# Patient Record
Sex: FEMALE | Race: WHITE | NOT HISPANIC OR LATINO | Employment: UNEMPLOYED | URBAN - METROPOLITAN AREA
[De-identification: names, ages, dates, MRNs, and addresses within clinical notes are randomized per-mention and may not be internally consistent; named-entity substitution may affect disease eponyms.]

---

## 2024-02-16 DIAGNOSIS — G62.9 NEUROPATHY: Primary | ICD-10-CM

## 2024-02-19 RX ORDER — PREGABALIN 75 MG/1
75 CAPSULE ORAL 2 TIMES DAILY
Qty: 180 CAPSULE | Refills: 0 | Status: SHIPPED | OUTPATIENT
Start: 2024-02-19

## 2024-04-15 ENCOUNTER — APPOINTMENT (OUTPATIENT)
Dept: PRIMARY CARE | Facility: CLINIC | Age: 74
End: 2024-04-15
Payer: MEDICARE

## 2024-04-24 ENCOUNTER — OFFICE VISIT (OUTPATIENT)
Dept: PRIMARY CARE | Facility: CLINIC | Age: 74
End: 2024-04-24
Payer: MEDICARE

## 2024-04-24 VITALS
HEIGHT: 64 IN | HEART RATE: 77 BPM | OXYGEN SATURATION: 98 % | DIASTOLIC BLOOD PRESSURE: 71 MMHG | SYSTOLIC BLOOD PRESSURE: 117 MMHG | TEMPERATURE: 98.1 F | WEIGHT: 190 LBS | BODY MASS INDEX: 32.44 KG/M2

## 2024-04-24 DIAGNOSIS — Z79.899 CONTROLLED SUBSTANCE AGREEMENT SIGNED: ICD-10-CM

## 2024-04-24 DIAGNOSIS — E78.5 HYPERLIPIDEMIA, UNSPECIFIED HYPERLIPIDEMIA TYPE: ICD-10-CM

## 2024-04-24 DIAGNOSIS — Z23 IMMUNIZATION DUE: ICD-10-CM

## 2024-04-24 DIAGNOSIS — Z12.11 COLON CANCER SCREENING: ICD-10-CM

## 2024-04-24 DIAGNOSIS — R73.01 IMPAIRED FASTING GLUCOSE: ICD-10-CM

## 2024-04-24 DIAGNOSIS — Z12.31 ENCOUNTER FOR SCREENING MAMMOGRAM FOR MALIGNANT NEOPLASM OF BREAST: ICD-10-CM

## 2024-04-24 DIAGNOSIS — I10 PRIMARY HYPERTENSION: ICD-10-CM

## 2024-04-24 DIAGNOSIS — Z00.00 MEDICARE ANNUAL WELLNESS VISIT, SUBSEQUENT: Primary | ICD-10-CM

## 2024-04-24 DIAGNOSIS — M20.42 HAMMER TOE OF LEFT FOOT: ICD-10-CM

## 2024-04-24 DIAGNOSIS — G62.9 NEUROPATHY: ICD-10-CM

## 2024-04-24 DIAGNOSIS — Z13.1 DIABETES MELLITUS SCREENING: ICD-10-CM

## 2024-04-24 DIAGNOSIS — R92.8 ABNORMAL SCREENING MAMMOGRAM: ICD-10-CM

## 2024-04-24 PROBLEM — M54.30 SCIATICA: Status: ACTIVE | Noted: 2024-04-24

## 2024-04-24 PROBLEM — N60.99 ATYPICAL HYPERPLASIA OF BREAST: Status: ACTIVE | Noted: 2024-04-24

## 2024-04-24 LAB
AMPHETAMINES UR QL SCN: NORMAL
BARBITURATES UR QL SCN: NORMAL
BENZODIAZ UR QL SCN: NORMAL
BZE UR QL SCN: NORMAL
CANNABINOIDS UR QL SCN: NORMAL
FENTANYL+NORFENTANYL UR QL SCN: NORMAL
METHADONE UR QL SCN: NORMAL
OPIATES UR QL SCN: NORMAL
OXYCODONE+OXYMORPHONE UR QL SCN: NORMAL
PCP UR QL SCN: NORMAL

## 2024-04-24 PROCEDURE — 3078F DIAST BP <80 MM HG: CPT | Performed by: FAMILY MEDICINE

## 2024-04-24 PROCEDURE — 1158F ADVNC CARE PLAN TLK DOCD: CPT | Performed by: FAMILY MEDICINE

## 2024-04-24 PROCEDURE — 1159F MED LIST DOCD IN RCRD: CPT | Performed by: FAMILY MEDICINE

## 2024-04-24 PROCEDURE — 99214 OFFICE O/P EST MOD 30 MIN: CPT | Performed by: FAMILY MEDICINE

## 2024-04-24 PROCEDURE — 1036F TOBACCO NON-USER: CPT | Performed by: FAMILY MEDICINE

## 2024-04-24 PROCEDURE — 80307 DRUG TEST PRSMV CHEM ANLYZR: CPT

## 2024-04-24 PROCEDURE — 90471 IMMUNIZATION ADMIN: CPT | Performed by: FAMILY MEDICINE

## 2024-04-24 PROCEDURE — 3074F SYST BP LT 130 MM HG: CPT | Performed by: FAMILY MEDICINE

## 2024-04-24 PROCEDURE — 1126F AMNT PAIN NOTED NONE PRSNT: CPT | Performed by: FAMILY MEDICINE

## 2024-04-24 PROCEDURE — 90715 TDAP VACCINE 7 YRS/> IM: CPT | Performed by: FAMILY MEDICINE

## 2024-04-24 PROCEDURE — G0439 PPPS, SUBSEQ VISIT: HCPCS | Performed by: FAMILY MEDICINE

## 2024-04-24 PROCEDURE — 1123F ACP DISCUSS/DSCN MKR DOCD: CPT | Performed by: FAMILY MEDICINE

## 2024-04-24 PROCEDURE — 1157F ADVNC CARE PLAN IN RCRD: CPT | Performed by: FAMILY MEDICINE

## 2024-04-24 PROCEDURE — 1170F FXNL STATUS ASSESSED: CPT | Performed by: FAMILY MEDICINE

## 2024-04-24 RX ORDER — LOSARTAN POTASSIUM 50 MG/1
50 TABLET ORAL DAILY
COMMUNITY
Start: 2024-02-22 | End: 2024-04-24 | Stop reason: SDUPTHER

## 2024-04-24 RX ORDER — LOSARTAN POTASSIUM 50 MG/1
50 TABLET ORAL DAILY
Qty: 90 TABLET | Refills: 1 | Status: SHIPPED | OUTPATIENT
Start: 2024-04-24 | End: 2024-10-21

## 2024-04-24 RX ORDER — ROSUVASTATIN CALCIUM 5 MG/1
5 TABLET, COATED ORAL DAILY
Qty: 90 TABLET | Refills: 1 | Status: SHIPPED | OUTPATIENT
Start: 2024-04-24 | End: 2024-10-21

## 2024-04-24 RX ORDER — ROSUVASTATIN CALCIUM 5 MG/1
5 TABLET, COATED ORAL DAILY
COMMUNITY
Start: 2024-04-05 | End: 2024-04-24 | Stop reason: SDUPTHER

## 2024-04-24 ASSESSMENT — PAIN SCALES - GENERAL: PAINLEVEL: 0-NO PAIN

## 2024-04-24 ASSESSMENT — ACTIVITIES OF DAILY LIVING (ADL)
MANAGING_FINANCES: INDEPENDENT
GROCERY_SHOPPING: INDEPENDENT
DRESSING: INDEPENDENT
BATHING: INDEPENDENT
TAKING_MEDICATION: INDEPENDENT
DOING_HOUSEWORK: INDEPENDENT

## 2024-04-24 ASSESSMENT — PATIENT HEALTH QUESTIONNAIRE - PHQ9
1. LITTLE INTEREST OR PLEASURE IN DOING THINGS: NOT AT ALL
SUM OF ALL RESPONSES TO PHQ9 QUESTIONS 1 AND 2: 0
2. FEELING DOWN, DEPRESSED OR HOPELESS: NOT AT ALL

## 2024-04-24 NOTE — PROGRESS NOTES
Subjective   Reason for Visit: Linda Garibay is an 73 y.o. female here for a medicare wellness exam (Blood work orders, mammogram, cologuard,), Med Refill, and signed contract for lyrica.          Reviewed all medications by prescribing practitioner or clinical pharmacist (such as prescriptions, OTCs, herbal therapies and supplements) and documented in the medical record.    HPI  1.  Medicare wellness/physical exam.  Pap: completed  Mammogram: last done 2021 benign   Colonoscopy: last done  10 years ago due  Immunizations: not done    Review of Systems  All pertinent positive symptoms are included in the history of present illness.    All other systems have been reviewed and are negative and noncontributory to this patient's current ailments.    No past medical history on file.  Past Surgical History:   Procedure Laterality Date    BI MAMMO GUIDED LOCALIZATION BREAST LEFT Left 10/10/2013    BI MAMMO GUIDED LOCALIZATION BREAST LEFT [facility]     Social History     Tobacco Use    Smoking status: Never    Smokeless tobacco: Never   Vaping Use    Vaping status: Never Used   Substance Use Topics    Alcohol use: Not Currently    Drug use: Never     No family history on file.  Allergies   Allergen Reactions    Ibuprofen Unknown    Nsaids (Non-Steroidal Anti-Inflammatory Drug) GI Upset     Immunization History   Administered Date(s) Administered    Influenza, injectable, quadrivalent 10/06/2017, 10/23/2018    Influenza, seasonal, injectable 08/06/2015, 08/16/2017    Influenza, seasonal, injectable, preservative free 10/16/2015    Tdap vaccine, age 7 year and older (BOOSTRIX, ADACEL) 04/24/2024     Current Outpatient Medications   Medication Instructions    losartan (COZAAR) 50 mg, oral, Daily    pregabalin (LYRICA) 75 mg, oral, 2 times daily    rosuvastatin (CRESTOR) 5 mg, oral, Daily       Patient Self Assessment of Health Status  Patient Self Assessment: Good    Nutrition and Exercise  Current Diet: Well  "Balanced Diet  Adequate Fluid Intake: Yes  Caffeine: Yes  Exercise Frequency: Regularly    Functional Ability/Level of Safety  Cognitive Impairment Observed: No cognitive impairment observed  Cognitive Impairment Reported: Cognitive impairment reported by patient or family    Home Safety Risk Factors: None    Objective   Visit Vitals  /71   Pulse 77   Temp 36.7 °C (98.1 °F)   Ht 1.626 m (5' 4\")   Wt 86.2 kg (190 lb)   SpO2 98%   BMI 32.61 kg/m²   Smoking Status Never   BSA 1.97 m²        The 10-year ASCVD risk score (Delfino CEDENO, et al., 2019) is: 14.9%    Values used to calculate the score:      Age: 73 years      Sex: Female      Is Non- : No      Diabetic: No      Tobacco smoker: No      Systolic Blood Pressure: 117 mmHg      Is BP treated: Yes      HDL Cholesterol: 51 MG/DL      Total Cholesterol: 227 MG/DL    Physical Exam  CONSTITUTIONAL - well nourished, well developed, looks like stated age, in no acute distress, not ill-appearing, and not tired appearing  SKIN - normal skin color and pigmentation, normal skin turgor without rash, lesions, or nodules visualized  HEAD - no trauma, normocephalic  EYES - pupils are equal and reactive to light, extraocular muscles are intact, and normal external exam  ENT - TM's intact, no injection, no signs of infection, uvula midline, normal tongue movement and throat normal, no exudate, nasal passage without discharge and patent  NECK - supple without rigidity, no neck mass was observed, no thyromegaly or thyroid nodules  CHEST - clear to auscultation, no wheezing, no crackles and no rales, good effort  CARDIAC - regular rate and regular rhythm, no skipped beats, no murmur  ABDOMEN - no organomegaly, soft, nontender, nondistended, normal bowel sounds, no guarding/rebound/rigidity, negative McBurney sign and negative Maloney sign  EXTREMITIES - no obvious or evident edema, no obvious or evident deformities  NEUROLOGICAL - normal gait, normal " balance, normal motor, no ataxia, alert, oriented and no focal signs  PSYCHIATRIC - alert, pleasant and cordial, age-appropriate  IMMUNOLOGIC - no cervical lymphadenopathy    Assessment/Plan   Problem List Items Addressed This Visit    None  Visit Diagnoses       Medicare annual wellness visit, subsequent    -  Primary    Colon cancer screening        Relevant Orders    Colonoscopy Screening; Average Risk Patient    Immunization due        Relevant Orders    Tdap vaccine, age 7 years and older  (BOOSTRIX) (Completed)    Encounter for screening mammogram for malignant neoplasm of breast        Relevant Orders    BI mammo bilateral diagnostic tomosynthesis    Hyperlipidemia, unspecified hyperlipidemia type        Relevant Medications    rosuvastatin (Crestor) 5 mg tablet    Other Relevant Orders    Lipid Panel    Primary hypertension        Relevant Medications    losartan (Cozaar) 50 mg tablet    Other Relevant Orders    CBC    Comprehensive Metabolic Panel    TSH with reflex to Free T4 if abnormal    Neuropathy        Relevant Orders    Drug Screen, Urine With Reflex to Confirmation    Hammer toe of left foot        Relevant Orders    Referral to Podiatry    Diabetes mellitus screening        Relevant Orders    Hemoglobin A1C    Impaired fasting glucose        Relevant Orders    Hemoglobin A1C    Abnormal screening mammogram        Relevant Orders    BI mammo bilateral diagnostic tomosynthesis    Controlled substance agreement signed        Relevant Orders    Drug Screen, Urine With Reflex to Confirmation          Linda is presenting for medicare wellness as well as concerns about being interested in weaning off of her lyrica.   Discussed risk vs benefit, patient expresses understanding.  We will proceed with 50mg BID for 2 weeks then 50mg every day, then 50mg every other day.   She will present to clinic after completion of this regimen.  We will re-evaluate at that point if she'll need to return to her previous  dosage for neuropathy  Signed a controlled substance agreement today    She also expressed concerns about losing her voice, pharyngeal exam noted no abnormalities, she has an ENT who will do a hearing test on her as well as clean out cerumen. Discussed her ENT will be able to do a laryngoscope and visualize any abnormalities at that time    Screening tests ordered today  Routine lab work also ordered    I have personally reviewed all available pertinent labs, imaging, and consult notes with the patient.     All questions and concerns were addressed. Patient verbalizes understanding instructions and agrees with established plan of care.     Patient seen and discussed with Dr. Dre Cristobal MD       Patient Care Team:  Timoteo Erickson DO as PCP - General

## 2024-04-25 NOTE — PROGRESS NOTES
I reviewed and examined the patient. I was present for the key exam elements, and I fully participated in the patient's care. I discussed the management of the care with the resident. I have personally reviewed the pertinent labs and imaging, as well as recent notes, with the patient. I have reviewed the note above and agree with the resident's medical decision making as documented in the resident's note, in addition to the following comments / findings:     Agree with the rest of the plan outlined below by resident physician. No red flags.      The patient understands and agrees to the assessment and plan of care. Patient has also agreed to follow up and comply with the treatment and evaluation as recommended today. Patient was instructed to call the office at [phone] should questions arise regarding their treatment or care.     Timoteo Erickson DO, FAOASM  Family Medicine   [address], [address]  [place] [address]     Timoteo Erickson DO

## 2024-05-01 ENCOUNTER — LAB (OUTPATIENT)
Dept: LAB | Facility: LAB | Age: 74
End: 2024-05-01
Payer: MEDICARE

## 2024-05-01 ENCOUNTER — TELEPHONE (OUTPATIENT)
Dept: PRIMARY CARE | Facility: CLINIC | Age: 74
End: 2024-05-01

## 2024-05-01 DIAGNOSIS — R73.01 IMPAIRED FASTING GLUCOSE: ICD-10-CM

## 2024-05-01 DIAGNOSIS — E78.5 HYPERLIPIDEMIA, UNSPECIFIED HYPERLIPIDEMIA TYPE: ICD-10-CM

## 2024-05-01 DIAGNOSIS — Z13.1 DIABETES MELLITUS SCREENING: ICD-10-CM

## 2024-05-01 DIAGNOSIS — I10 PRIMARY HYPERTENSION: ICD-10-CM

## 2024-05-01 LAB
ALBUMIN SERPL BCP-MCNC: 4.2 G/DL (ref 3.4–5)
ALP SERPL-CCNC: 85 U/L (ref 33–136)
ALT SERPL W P-5'-P-CCNC: 16 U/L (ref 7–45)
ANION GAP SERPL CALC-SCNC: 12 MMOL/L (ref 10–20)
AST SERPL W P-5'-P-CCNC: 17 U/L (ref 9–39)
BILIRUB SERPL-MCNC: 0.6 MG/DL (ref 0–1.2)
BUN SERPL-MCNC: 15 MG/DL (ref 6–23)
CALCIUM SERPL-MCNC: 8.9 MG/DL (ref 8.6–10.3)
CHLORIDE SERPL-SCNC: 102 MMOL/L (ref 98–107)
CHOLEST SERPL-MCNC: 159 MG/DL (ref 0–199)
CHOLESTEROL/HDL RATIO: 3.7
CO2 SERPL-SCNC: 29 MMOL/L (ref 21–32)
CREAT SERPL-MCNC: 0.83 MG/DL (ref 0.5–1.05)
EGFRCR SERPLBLD CKD-EPI 2021: 75 ML/MIN/1.73M*2
ERYTHROCYTE [DISTWIDTH] IN BLOOD BY AUTOMATED COUNT: 13.1 % (ref 11.5–14.5)
EST. AVERAGE GLUCOSE BLD GHB EST-MCNC: 126 MG/DL
GLUCOSE SERPL-MCNC: 96 MG/DL (ref 74–99)
HBA1C MFR BLD: 6 %
HCT VFR BLD AUTO: 42.4 % (ref 36–46)
HDLC SERPL-MCNC: 42.8 MG/DL
HGB BLD-MCNC: 13.5 G/DL (ref 12–16)
LDLC SERPL CALC-MCNC: 92 MG/DL
MCH RBC QN AUTO: 30.2 PG (ref 26–34)
MCHC RBC AUTO-ENTMCNC: 31.8 G/DL (ref 32–36)
MCV RBC AUTO: 95 FL (ref 80–100)
NON HDL CHOLESTEROL: 116 MG/DL (ref 0–149)
NRBC BLD-RTO: 0 /100 WBCS (ref 0–0)
PLATELET # BLD AUTO: 226 X10*3/UL (ref 150–450)
POTASSIUM SERPL-SCNC: 4.5 MMOL/L (ref 3.5–5.3)
PROT SERPL-MCNC: 6.9 G/DL (ref 6.4–8.2)
RBC # BLD AUTO: 4.47 X10*6/UL (ref 4–5.2)
SODIUM SERPL-SCNC: 138 MMOL/L (ref 136–145)
TRIGL SERPL-MCNC: 121 MG/DL (ref 0–149)
TSH SERPL-ACNC: 2.29 MIU/L (ref 0.44–3.98)
VLDL: 24 MG/DL (ref 0–40)
WBC # BLD AUTO: 4.5 X10*3/UL (ref 4.4–11.3)

## 2024-05-01 PROCEDURE — 80053 COMPREHEN METABOLIC PANEL: CPT

## 2024-05-01 PROCEDURE — 36415 COLL VENOUS BLD VENIPUNCTURE: CPT

## 2024-05-01 PROCEDURE — 80061 LIPID PANEL: CPT

## 2024-05-01 PROCEDURE — 84443 ASSAY THYROID STIM HORMONE: CPT

## 2024-05-01 PROCEDURE — 85027 COMPLETE CBC AUTOMATED: CPT

## 2024-05-01 PROCEDURE — 83036 HEMOGLOBIN GLYCOSYLATED A1C: CPT

## 2024-05-01 NOTE — TELEPHONE ENCOUNTER
Patient called stating that you were going send in prescriptions to wean her off her lyrica and the pharmacy has not received the prescriptions yet.

## 2024-05-08 ENCOUNTER — HOSPITAL ENCOUNTER (OUTPATIENT)
Dept: RADIOLOGY | Facility: HOSPITAL | Age: 74
Discharge: HOME | End: 2024-05-08
Payer: MEDICARE

## 2024-05-08 VITALS — BODY MASS INDEX: 30.73 KG/M2 | WEIGHT: 180 LBS | HEIGHT: 64 IN

## 2024-05-08 DIAGNOSIS — Z12.31 ENCOUNTER FOR SCREENING MAMMOGRAM FOR MALIGNANT NEOPLASM OF BREAST: ICD-10-CM

## 2024-05-08 DIAGNOSIS — R92.8 ABNORMAL SCREENING MAMMOGRAM: ICD-10-CM

## 2024-05-08 PROCEDURE — 77066 DX MAMMO INCL CAD BI: CPT | Performed by: STUDENT IN AN ORGANIZED HEALTH CARE EDUCATION/TRAINING PROGRAM

## 2024-05-08 PROCEDURE — 77062 BREAST TOMOSYNTHESIS BI: CPT

## 2024-05-08 PROCEDURE — G0279 TOMOSYNTHESIS, MAMMO: HCPCS | Performed by: STUDENT IN AN ORGANIZED HEALTH CARE EDUCATION/TRAINING PROGRAM

## 2024-08-15 ENCOUNTER — APPOINTMENT (OUTPATIENT)
Dept: OTOLARYNGOLOGY | Facility: CLINIC | Age: 74
End: 2024-08-15
Payer: MEDICARE

## 2024-08-15 VITALS — TEMPERATURE: 96.8 F | HEIGHT: 64 IN | WEIGHT: 193.6 LBS | BODY MASS INDEX: 33.05 KG/M2

## 2024-08-15 DIAGNOSIS — H93.13 BILATERAL TINNITUS: ICD-10-CM

## 2024-08-15 DIAGNOSIS — R44.2 PHANTOSMIA: ICD-10-CM

## 2024-08-15 DIAGNOSIS — H61.23 BILATERAL IMPACTED CERUMEN: ICD-10-CM

## 2024-08-15 DIAGNOSIS — H90.3 BILATERAL SENSORINEURAL HEARING LOSS: ICD-10-CM

## 2024-08-15 DIAGNOSIS — R26.89 IMBALANCE: Primary | ICD-10-CM

## 2024-08-15 DIAGNOSIS — R49.0 HOARSENESS: ICD-10-CM

## 2024-08-15 DIAGNOSIS — J34.2 DEVIATED NASAL SEPTUM: ICD-10-CM

## 2024-08-15 PROBLEM — I10 PRIMARY HYPERTENSION: Status: ACTIVE | Noted: 2024-05-01

## 2024-08-15 PROBLEM — E78.5 HYPERLIPIDEMIA: Status: ACTIVE | Noted: 2024-05-01

## 2024-08-15 PROCEDURE — 1036F TOBACCO NON-USER: CPT | Performed by: OTOLARYNGOLOGY

## 2024-08-15 PROCEDURE — 1159F MED LIST DOCD IN RCRD: CPT | Performed by: OTOLARYNGOLOGY

## 2024-08-15 PROCEDURE — 69210 REMOVE IMPACTED EAR WAX UNI: CPT | Performed by: OTOLARYNGOLOGY

## 2024-08-15 PROCEDURE — 1157F ADVNC CARE PLAN IN RCRD: CPT | Performed by: OTOLARYNGOLOGY

## 2024-08-15 PROCEDURE — 3008F BODY MASS INDEX DOCD: CPT | Performed by: OTOLARYNGOLOGY

## 2024-08-15 PROCEDURE — 99203 OFFICE O/P NEW LOW 30 MIN: CPT | Performed by: OTOLARYNGOLOGY

## 2024-08-15 PROCEDURE — 31575 DIAGNOSTIC LARYNGOSCOPY: CPT | Performed by: OTOLARYNGOLOGY

## 2024-08-15 NOTE — PROGRESS NOTES
Chief Complaint   Patient presents with    New Patient Visit     LOV 5/2019 WEAK VOICE, HEARING LOSS, NO RECENT AUDIO, BILATERAL TINNITUS, BURNING SMELL     HPI:  Linda Garibay is a 74 y.o. female who complains of some chronic problems with bilateral hearing loss.  Known history of bilateral sloping high-frequency sensorineural hearing loss last checked in 2019.  Has some wax impactions and itchy ears as well.  She also has some bilateral tinnitus which is longstanding.  She is also been having some hoarseness especially with singing over the past 2 to 3 years.  Some globus.  No sore throat, dysphagia, shortness of breath, coughing or throat clearing.  She may have some reflux.  Also feels that she gets off balance at times.  No sensation of movement or vertigo.  No lightheaded or near syncope.  She does have problems with some decrease sensation in her feet, as well as some lower extremity joint and back problems  On occasion she will have a phantom smell of smoke or chemicals.  She is not sure if it has something neighbors are burning or if it is truly a phantom smell.  No difficulty smelling normal things.  No significant nasal congestion    PMH:  Past Medical History:   Diagnosis Date    Atypical ductal hyperplasia, breast     HTN (hypertension)      Past Surgical History:   Procedure Laterality Date    BI MAMMO GUIDED BREAST LEFT LOCALIZATION Left 10/10/2013    BI MAMMO GUIDED LOCALIZATION BREAST LEFT [facility]    BREAST BIOPSY      BREAST LUMPECTOMY      TOTAL KNEE ARTHROPLASTY           Medications:     Current Outpatient Medications:     losartan (Cozaar) 50 mg tablet, Take 1 tablet (50 mg) by mouth once daily., Disp: 90 tablet, Rfl: 1    rosuvastatin (Crestor) 5 mg tablet, Take 1 tablet (5 mg) by mouth once daily., Disp: 90 tablet, Rfl: 1    pregabalin (Lyrica) 75 mg capsule, TAKE ONE CAPSULE BY MOUTH TWO TIMES A DAY (Patient not taking: Reported on 8/15/2024), Disp: 180 capsule, Rfl: 0  "    Allergies:  Allergies   Allergen Reactions    Ibuprofen Unknown    Nsaids (Non-Steroidal Anti-Inflammatory Drug) GI Upset        ROS:  Review of systems normal unless stated otherwise in the HPI and/or PMH.    Physical Exam:  Temperature 36 °C (96.8 °F), height 1.626 m (5' 4\"), weight 87.8 kg (193 lb 9.6 oz). Body mass index is 33.23 kg/m².     GENERAL APPEARANCE: Well developed and well nourished.  Alert and oriented in no acute distress.  Normal vocal quality.      HEAD/FACE: No erythema or edema or facial tenderness.  Normal facial nerve function bilaterally.    EAR:       EXTERNAL: Normal pinnas and bilateral obstructive cerumen impaction was removed by myself with instrumentation using the operating microscope.  Normal pinnas and external auditory canals after cleaning.       MIDDLE EAR: Tympanic membranes intact and mobile with normal landmarks.  Middle ear space appears well aerated.       TUBE STATUS: N/A       MASTOID CAVITY: N/A       HEARING: Gross hearing assessment is within normal limits.      NOSE:       VISUALIZED USING: Anterior rhinoscopy with headlight and nasal speculum.  Flexible scoping       DORSUM: Midline, nontraumatic appearance.       MUCOSA: Normal-appearing.       SECRETIONS: Normal.       SEPTUM: Right deviation       INFERIOR TURBINATES: Normal.       MIDDLE TURBINATES/MEATUS: Normal       BLEEDING: N/A         ORAL CAVITY/PHARYNX:       TEETH: Adequate dentition.       TONGUE: No mass or lesion.  Normal mobility.       FLOOR OF MOUTH: No mass or lesion.       PALATE: Normal hard palate, soft palate, and uvula.       OROPHARYNX: Normal without mass or lesion.       BUCCAL MUCOSA/GBS: Normal without mass or lesion.       LIPS: Normal.    LARYNX/HYPOPHARYNX/NASOPHARYNX: Flexible laryngoscopy was performed after consent secondary to an inadequate mirror examination.  The flexible laryngoscope was placed through the nasal cavity revealing normal nasopharynx, normal oropharynx, normal " hypopharynx, and normal larynx.  There is normal bilateral vocal cord mobility without any mucosal masses or lesions.    NECK: No palpable masses or abnormal adenopathy.  Trachea is midline.    THYROID: No thyromegaly or palpable nodule.    SALIVARY GLANDS: Normal bilateral parotid and submandibular glands by inspection and palpation.    TMJ's: Normal.    NEURO: Cranial nerve exam grossly normal bilaterally.       Assessment/Plan   Linda was seen today for new patient visit.  Diagnoses and all orders for this visit:  Imbalance (Primary)  -     Referral to Physical Therapy; Future  Hoarseness  -     Referral to Speech Therapy; Future  Bilateral sensorineural hearing loss  Bilateral impacted cerumen  Bilateral tinnitus  Deviated nasal septum  Phantosmia     Both ears were cleaned of impacted wax today which hopefully helps her hearing.  She does have chronic hearing loss and tinnitus that I think we should recheck an audiogram and likely recommend hearing aids.  No evidence of disease on examination regarding her hoarseness.  Educated about this and recommend speech and voice therapy.  Recommend balance therapy for her imbalance which I do not think is vestibular related.  Probably multifactorial.  Try [place] pot and Flonase for her phantosmia.  Also see if she is having in other places besides home where the neighbors may be burning things.  If is not getting better we may consider imaging.  Follow up in about 3 months (around 11/15/2024) for Recheck.     Arie Villalpando MD

## 2024-08-20 ENCOUNTER — EVALUATION (OUTPATIENT)
Dept: SPEECH THERAPY | Facility: CLINIC | Age: 74
End: 2024-08-20
Payer: MEDICARE

## 2024-08-20 DIAGNOSIS — R49.0 HOARSENESS: ICD-10-CM

## 2024-08-20 PROCEDURE — 92507 TX SP LANG VOICE COMM INDIV: CPT | Mod: GN | Performed by: SPEECH-LANGUAGE PATHOLOGIST

## 2024-08-20 PROCEDURE — 92524 BEHAVRAL QUALIT ANALYS VOICE: CPT | Mod: GN | Performed by: SPEECH-LANGUAGE PATHOLOGIST

## 2024-08-20 ASSESSMENT — ENCOUNTER SYMPTOMS
OCCASIONAL FEELINGS OF UNSTEADINESS: 0
DEPRESSION: 0
LOSS OF SENSATION IN FEET: 1

## 2024-08-20 NOTE — PROGRESS NOTES
Speech-Language Pathology    Voice Evaluation    Patient Name: Linda Garibay  MRN: [id number]  Today's Date: 8/20/2024     Time Calculation  Start Time: 0800  Stop Time: 0900  Time Calculation (min): 60 min  Eval - 40 min  Tx - 20 min    Current Problem:  1. Hoarseness  Referral to Speech Therapy    Follow Up In Speech Therapy          Voice Assessment:  Linda demonstrates vocal hoarseness with use of clavicular breathing pattern and frequent use of glottal naidu. Linda is dissatisfied with her voice, and feels it makes an impact on her daily life as evidenced by her report on the Vocal Handicap Index. Linda reports that this hoarseness started a few years ago, and has been progressively worsening. Linda will benefit from outpatient speech therapy services at this time to reduce risk for further vocal abuse and further impact on social/emotional wellbeing.      Voice Plan of Care:  Follow up 1x per week for 4 total visits.   Visits completed: 1/4    Subjective: Pt is pleasant and participative. Open minded to treatment approaches throughout. She is a receptive listener and engages in all topics of discussion. She is eager to make changes and improve her voice.     General Visit Information:   Per ENT evaluation 8/15/24:   Linda was seen today for new patient visit.  Diagnoses and all orders for this visit:  Imbalance (Primary)  -     Referral to Physical Therapy; Future  Hoarseness  -     Referral to Speech Therapy; Future  Bilateral sensorineural hearing loss  Bilateral impacted cerumen  Bilateral tinnitus  Deviated nasal septum  Phantosmia     Both ears were cleaned of impacted wax today which hopefully helps her hearing.  She does have chronic hearing loss and tinnitus that I think we should recheck an audiogram and likely recommend hearing aids.  No evidence of disease on examination regarding her hoarseness.  Educated about this and recommend speech and voice therapy.  Recommend balance therapy for her  "imbalance which I do not think is vestibular related.  Probably multifactorial.  Try [place] pot and Flonase for her phantosmia.  Also see if she is having in other places besides home where the neighbors may be burning things.  If is not getting better we may consider imaging.  Follow up in about 3 months (around 11/15/2024) for Recheck.         Objective   Vocal Handicap Index (VHI)   Linda completed the VHI, a patient-reported measure that indicates level of handicap second to voice impairment. Linda scored a total of 23, placing them in the \"Mild\" impairment range.      GRBAS Scale  Patient was assess for vocal hoarseness as characterized using the scale of Grade, Roughness, Breathiness, Asthenia, and Strain (GRBAS).   0 - normal, 1 - slightly impaired, 2- moderately impaired, 3 - extremely impaired.                  stGstrstastdstest:st st1st Roughness: 1              Breathiness: 2              Asthenia: 1              Strain: 0     Qualitative notes:   - MPT - 13 seconds   - Clavicular breathing style   - S/Z - 16 seconds/16 seconds = 1.0  - Glottal naidu vocal quality noted throughout  - Pitch breaks on vocal glide     Behavioral notes:  - Softer in the morning, takes time to \"build up strength\"  - Becomes stronger when using it more through the day  - Softness varies  - Drinks 3-4 12 oz cups coffee daily   - No carbonated beverages  - Aims for 64 oz of water daily, but probably doesn't drink that much   - No smoking  - Alcohol socially  - First thing in the morning, voice is \"weak\", but it doesn't last long.   - Has to \"put more effort behind it\"   - No throat irritation or significant post nasal drip  - Reports no throat clearing or chronic coughing - but does clear throat many times throughout evaluation.   - \"probably\" has reflux, comes intermittently when she has certain foods  - Can't humm anymore   - Can't sing now, says it \"sounds like a frog\"     Goals:   LTG 1: Pt to improve vocal quality and endurance " for adequate vocalization across activities of daily living as evidenced by reduction in score of pt-reported outcome measures to within normal limits.  (Date initiated: 8/20/24; target date: 10/20/24)  STG 1: Pt to demonstrate understanding and adequate follow through of vocal hygiene program with pt report of 85% follow through across environments  Date initiated: 8/20/24   Target date: 9/20/24              Baseline: 0%, not aware              Status: Progressing              Progress this visit: Pt provided with initial introduction and review of vocal hygiene techniques to help reduce vocal abuse/misuse. Handout provided to support. The main component is reducing talking at end of breath, reducing throat clearing, and reducing strain.   STG 2: Pt to commit to and demonstrate functional completion of behavioral changes to help improve vocal function with pt report of 85% follow through across environments.   Date initiated: 8/20/24   Target date: 9/20/24              Baseline: 0%, not aware              Status: Progressing              Progress this visit: Not addressed; plan to approach using CTT techniques at next session.   STG 3: Pt to participate in diaphragmatic breathing exercises to help support a sustained phonation timing of 12 seconds with adequate vocal quality.   Date initiated: 8/20/24   Target date: 9/20/24              Baseline: 0%, not aware              Status: Progressing              Progress this visit: Initiated beginning approaches to diaphragmatic breathing using visual and tactile cues to support improved accuracy with this technique. Handout provided to support continuation of first exercise for strengthening diaphragmatic breathing.     Voice Treatment: Yes - see details above  Time: 20 min        OP Education:  Pt was engaged in extensive education and discussion of the anatomy and phsyiology of the vocal mechanism, vocal wellness strategies, and strategies for diaphragmatic breathing  exercises. Handouts provided to support. Pt verbalizes understanding.

## 2024-08-27 ENCOUNTER — TREATMENT (OUTPATIENT)
Dept: SPEECH THERAPY | Facility: CLINIC | Age: 74
End: 2024-08-27
Payer: MEDICARE

## 2024-08-27 DIAGNOSIS — R49.0 HOARSENESS: ICD-10-CM

## 2024-08-27 PROCEDURE — 92507 TX SP LANG VOICE COMM INDIV: CPT | Mod: GN | Performed by: SPEECH-LANGUAGE PATHOLOGIST

## 2024-08-27 NOTE — PROGRESS NOTES
"Speech-Language Pathology    Voice Treatment    Patient Name: Linda Garibay  MRN: [id number]  Today's Date: 8/27/2024     Time Calculation  Start Time: 1115  Stop Time: 1200  Time Calculation (min): 45 min      Current Problem:  1. Hoarseness  Follow Up In Speech Therapy          Voice Assessment:  Linda participated in diaphragmatic breathing exercises and conversational training therapy (CTT) to maximize phonatory function and use.     Voice Plan of Care:  Follow up 1x per week for 4 total visits.   Visits completed: 2/4    Subjective: Pt is pleasant and participative. She reports good follow through with home tasks and recommended behavioral modifications        Objective   GRBAS Scale  Patient was assess for vocal hoarseness as characterized using the scale of Grade, Roughness, Breathiness, Asthenia, and Strain (GRBAS).   0 - normal, 1 - slightly impaired, 2- moderately impaired, 3 - extremely impaired.                  stGstrstastdstest:st st1st Roughness: 0              Breathiness: 1              Asthenia: 1              Strain: 0     Qualitative notes:   8/15/24  - MPT - 13 seconds   - Clavicular breathing style   - S/Z - 16 seconds/16 seconds = 1.0  - Glottal naidu vocal quality noted throughout  - Pitch breaks on vocal glide     Behavioral notes:  8/27/24:   -Keeping more hydrated  -Been talking more, which she feels helps the strength of her voice  -Rates her overall vocal quality today as 70% (with 100% being \"normal\" baseline function).        8/15/24  - Softer in the morning, takes time to \"build up strength\"  - Becomes stronger when using it more through the day  - Softness varies  - Drinks 3-4 12 oz cups coffee daily   - No carbonated beverages  - Aims for 64 oz of water daily, but probably doesn't drink that much   - No smoking  - Alcohol socially  - First thing in the morning, voice is \"weak\", but it doesn't last long.   - Has to \"put more effort behind it\"   - No throat irritation or significant post " "nasal drip  - Reports no throat clearing or chronic coughing - but does clear throat many times throughout evaluation.   - \"probably\" has reflux, comes intermittently when she has certain foods  - Can't humm anymore   - Can't sing now, says it \"sounds like a frog\"     Goals:   LTG 1: Pt to improve vocal quality and endurance for adequate vocalization across activities of daily living as evidenced by reduction in score of pt-reported outcome measures to within normal limits.  (Date initiated: 8/20/24; target date: 10/20/24)  STG 1: Pt to demonstrate understanding and adequate follow through of vocal hygiene program with pt report of 85% follow through across environments  Date initiated: 8/20/24   Target date: 9/20/24              Baseline: 0%, not aware              Status: GOAL MET              Progress this visit: Pt reports 100% follow through with recommended increase of hydration and use of belly breathing exercises.   STG 2: Pt to commit to and demonstrate functional completion of behavioral changes to help improve vocal function with pt report of 85% follow through across environments.   Date initiated: 8/20/24   Target date: 9/20/24              Baseline: 0%, not aware              Status: Progressing              Progress this visit: Pt instructed in a forward voice placement, clear speech strategies and their subsquent impact on overall voice and resonance through the tenants of CTT. Pt responded well, and was prompted to utilize these techniques daily during an intentional practice session using monologue training.   STG 3: Pt to participate in diaphragmatic breathing exercises to help support a sustained phonation timing of 12 seconds with adequate vocal quality.   Date initiated: 8/20/24   Target date: 9/20/24              Baseline: Unable              Status: Progressing              Progress this visit: Pt provided with moderate cuing for diaphragmatic breathing techniques with improved coordination " as compared to last session.     OP Education:  Pt was engaged in extensive education and discussion of the anatomy and phsyiology of the vocal mechanism, vocal wellness strategies, and strategies for diaphragmatic breathing exercises.. Pt verbalizes understanding.

## 2024-09-04 ENCOUNTER — TREATMENT (OUTPATIENT)
Dept: SPEECH THERAPY | Facility: CLINIC | Age: 74
End: 2024-09-04
Payer: MEDICARE

## 2024-09-04 DIAGNOSIS — R49.0 HOARSENESS: ICD-10-CM

## 2024-09-04 PROCEDURE — 92507 TX SP LANG VOICE COMM INDIV: CPT | Mod: GN | Performed by: SPEECH-LANGUAGE PATHOLOGIST

## 2024-09-04 NOTE — PROGRESS NOTES
"Speech-Language Pathology    Voice Treatment    Patient Name: Linda Garibay  MRN: [id number]  Today's Date: 9/4/2024     Time Calculation  Start Time: 0905  Stop Time: 0940  Time Calculation (min): 35 min      Current Problem:  1. Hoarseness  Follow Up In Speech Therapy          Voice Assessment:  Linda participated in review of vocal function exercises and discussion of plan of care,     Voice Plan of Care:  Follow up 1x per week for 4 total visits.   Visits completed: 3/4    Plan for discharge at next session    Subjective: Pt is pleasant and participative. She reports fair-at-best follow through with home tasks.         Objective   GRBAS Scale  Patient was assess for vocal hoarseness as characterized using the scale of Grade, Roughness, Breathiness, Asthenia, and Strain (GRBAS).   0 - normal, 1 - slightly impaired, 2- moderately impaired, 3 - extremely impaired.                  stGstrstastdstest:st st1st Roughness: 0              Breathiness: 0              Asthenia: 0              Strain: 0     Qualitative notes:   9/4/24  -Overall, voice sounds clear and WNL today.  - Pt rates her voice at 80% today (if 100% is \"perfect)    8/15/24  - MPT - 13 seconds   - Clavicular breathing style   - S/Z - 16 seconds/16 seconds = 1.0  - Glottal naidu vocal quality noted throughout  - Pitch breaks on vocal glide     Behavioral notes:  9/4/24:  - Has been keeping up with breathing exercises, but maybe not \"as much as she should\"  - Hasn't followed through much with the CTT approaches discussed last week.     8/27/24:   -Keeping more hydrated  -Been talking more, which she feels helps the strength of her voice  -Rates her overall vocal quality today as 70% (with 100% being \"normal\" baseline function).        8/15/24  - Softer in the morning, takes time to \"build up strength\"  - Becomes stronger when using it more through the day  - Softness varies  - Drinks 3-4 12 oz cups coffee daily   - No carbonated beverages  - Aims for 64 oz " "of water daily, but probably doesn't drink that much   - No smoking  - Alcohol socially  - First thing in the morning, voice is \"weak\", but it doesn't last long.   - Has to \"put more effort behind it\"   - No throat irritation or significant post nasal drip  - Reports no throat clearing or chronic coughing - but does clear throat many times throughout evaluation.   - \"probably\" has reflux, comes intermittently when she has certain foods  - Can't humm anymore   - Can't sing now, says it \"sounds like a frog\"     Goals:   LTG 1: Pt to improve vocal quality and endurance for adequate vocalization across activities of daily living as evidenced by reduction in score of pt-reported outcome measures to within normal limits.  (Date initiated: 8/20/24; target date: 10/20/24)  STG 1: Pt to demonstrate understanding and adequate follow through of vocal hygiene program with pt report of 85% follow through across environments  Date initiated: 8/20/24   Target date: 9/20/24              Baseline: 0%, not aware              Status: GOAL MET              Progress this visit: N/A  STG 2: Pt to commit to and demonstrate functional completion of behavioral changes to help improve vocal function with pt report of 85% follow through across environments.   Date initiated: 8/20/24   Target date: 9/20/24              Baseline: 0%, not aware              Status: Progressing              Progress this visit: Pt reports no follow through with home exercises/CTT from last session. Instructed in vocal function exercises which pt completes to 90% with mod cues for technique and breath coordination.   STG 3: Pt to participate in diaphragmatic breathing exercises to help support a sustained phonation timing of 12 seconds with adequate vocal quality.   Date initiated: 8/20/24   Target date: 9/20/24              Baseline: Unable              Status: Progressing              Progress this visit: Pt provided with moderate cuing for diaphragmatic " breathing techniques in coordination with vocal function exercises with a max phonation time of 8 seconds.     OP Education:  Pt was engaged in discussion of plan of care, home exercise program, and need for more follow through with home tasks. She is in agreement for plan to extend next visit to 2 weeks to allow for more time for follow through, then discharge with functional maint plan.

## 2024-09-07 NOTE — PROGRESS NOTES
Physical Therapy Evaluation and Treatment     Patient Name: Linda Garibay  MRN: [id number]  Encounter date: 9/9/2024  Time Calculation  Start Time: 1615  Stop Time: 1715  Time Calculation (min): 60 min  PT Evaluation Time Entry  PT Evaluation (Moderate) Time Entry: 30  PT Therapeutic Procedures Time Entry  Neuromuscular Re-Education Time Entry: 8  Therapeutic Activity Time Entry: 20    Visit # 1 of 10  Visits/Dates Authorized: NO AUTH / [place] VISITS     Current Problem:   Problem List Items Addressed This Visit             ICD-10-CM    Imbalance - Primary R26.89    Relevant Orders    Follow Up In Physical Therapy    Difficulty walking R26.2    Relevant Orders    Follow Up In Physical Therapy    Chronic bilateral low back pain with bilateral sciatica M54.42, M54.41, G89.29    Relevant Orders    Follow Up In Physical Therapy     Precautions:  Precautions  Precautions Comment: spondylolisthesis, fall risk  Past Medical History Relevant to Rehab: HTN, R TKR 1/2020    Subjective Evaluation    History of Present Illness  Date of onset: 1/7/2020 (imbalance over several years, onset of sciatic pain upon home from R TKR)  Mechanism of injury: Pt reports 6-8 falls over several years, 3 times in past year. In/outdoors, no warning. Denies lightheadedness. Pt feels sensation in feet may be reduced, can feel things but maybe lessened. Denies not perceiving temperature in feet.    After 1/2020 knee replacement, at start of [facility] had terrible sciatic pain that limited her, didn't trust getting through grocery store even with cart, like legs may not keep going.  Had injections. Started pregablin for sciatica around 2021. Weaned from pregabalin over the summer due to concern for side effects. Not as bad to finish shopping as before but can't be nearly as active as she wants to. Ongoing R knee issues since TKR, won't bend at times, not a feeling in joint, rather that it is in the muscle of anteromedial knee. Has cont to have R  "anteromedial knee pain and unable to advance strength due to pain.  At some point L knee lost full straightening, learned from R TKR that full ext is important, attempted exercises, e.g. propping and overpressure, but pain increased badly.  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 Feels unable to assist with most tasks now. Attempting balance exercises from [facility]. Attempts to use exercise equipment but can't tolerate it long at all. Used to walk 2-3 miles regularly, can't manage it now.  Pt states her quality of life has greatly suffered with these limitations.    Per ENT consult 8/15/24 (for hearing and throat issues, pt brought up ongoing balance problem):  \"...feels that she gets off balance at times. No sensation of movement or vertigo. No lightheaded or near syncope. She does have problems with some decrease sensation in her feet, as well as some lower extremity joint and back problems.\"    Pain  Current pain ratin  At best pain ratin (at best aware of knee but not pain)  At worst pain ratin (6-7/10 with extra activity, e.g. holiday prep, can't sleep)  Location: B anterior knee pain, B LBP, L lateral hip pain, and pain down R LE posteriorly      Pain Assessment: 0-10 (1-7/10)     Objective     Postural Observations    Additional Postural Observation Details  R anterior innominate:   R iliac crest superior standing, sitting, prone  R PSIS superior prone  R ischial tuberosity superior prone  R ASIS inferior supine      Neurological Testing     Additional Neurological Details  Pt reports perception of decreased pedal sensation, monofilament testing deferred this visit    Palpation     Additional Palpation Details  Hypertonicity LPS and gluteals    Tenderness     Left Hip   Tenderness in the PSIS.     Right Hip   Tenderness in the ASIS.     Additional Tenderness Details  TTP L greater trochanter    Active Range of Motion     Additional Active Range of " Motion Details  Cervical:  No gross ROM restrictions all planes    Lumbar:  Asymmetrical rotation/sidebending  Decreased flexion approx 25%  Decreased extension approx 50%    Hip:  Asymmetrical and decreased prone hip IR/ER  Lacks full extension approx 5-10 degrees, lacks full flexion, painful position change supine    Strength/Myotome Testing     Additional Strength Details  Decreased lumbopelvic and pelvohip strength    Ambulation     Observational Gait   Gait: antalgic and asymmetric   Decreased walking speed and stride length.     Additional Observational Gait Details  No assistive device, decreased arm swing and torso rotation, no path deviation      Romberg: Firm Eyes Open 30 sec, Eyes Closed 30 sec , no gross change in postural sway EO vs EC, able to add head turns without visible change in postural sway    Other Measures  Activities - Specific Balance Confidence Scale: 59% confidence (940/1600)    Treatments:     Neuromuscular Re-Ed:   Instructed and performed MET for R anterior innominate, neutral pelvis achieved, pt felt different upon standing.    Ther-Act:  Educated pt re bony pelvic anatomy and R anterior innominate yielding altered forces throughout lower kinematic chain and altered somatosensory input.  Instructed/trialed activities for neutral pelvis  Demonstrated Serola SI belt, sense of support/confidence in walking reported, instructed donning/doffing.  Instructed roll to side and sit up as a unit to get up    Assessment & Plan     Assessment  Impairments: abnormal gait, abnormal or restricted ROM, activity intolerance, impaired balance, impaired physical strength, lacks appropriate home exercise program, pain with function and safety issue  Assessment details:     Pt is a 75 y/o female with imbalance and pain limiting function. Pt with 6-8 falls, 3 in past year. Pt with back pain, B knee pain, and perception of decreased pedal sensation. Pt with postural asymmetries including restricted L knee  extension ROM and R anterior innominate. Neutral pelvis achieved with muscle energy technique, pt with sense of greater security and improved gait, further improved with Serola SI belt donned. No gross vestibular impairment. Assessment will be ongoing. Pt needs skilled PT to address factors limiting function and yielding falls.  Prognosis: good    Plan  Therapy options: will be seen for skilled physical therapy services  Planned modality interventions: traction, thermotherapy (hydrocollator packs) and electrical stimulation/Russian stimulation  Other planned modality interventions: light therapy, DN, kinesiology taping  Planned therapy interventions: abdominal trunk stabilization, manual therapy, neuromuscular re-education, orthotic fitting/training, postural training, strengthening, stretching, home exercise program, functional ROM exercises, flexibility, body mechanics training and balance/weight-bearing training  Other planned therapy interventions: Serola SI belt  Frequency: 2x week  Duration in visits: 10  Treatment plan discussed with: patient       Moderate complexity due to patient's clinical presentation being evolving with changing characteristics, with comorbidities/complexities to include repeated falls, multiple orthopedic impairments, loss of function, all of which may negatively impact rehab tolerance and progression.     Post-Treatment Symptoms:  Response to Interventions: Pt with greater comfort and more confidence in balance and walking, walked faster/longer strides than in years. Sense of support with Serola SI belt    Goals:   Active       PT Problem       PT Goals       Start:  09/09/24    Expected End:  12/08/24       1) Indep HEP and progression.  2) ABC Scale improved to at least 80% from 59% at eval.  3) Pt will perform home and yard tasks ad emily without symptom exacerbation.  4) Pt will walk for leisure/wellness ad emily without symptom exacerbation falls/near falls         Patient Stated  Goal 1       Start:  09/10/24    Expected End:  12/08/24

## 2024-09-09 ENCOUNTER — CLINICAL SUPPORT (OUTPATIENT)
Dept: PHYSICAL THERAPY | Facility: CLINIC | Age: 74
End: 2024-09-09
Payer: MEDICARE

## 2024-09-09 DIAGNOSIS — R26.2 DIFFICULTY WALKING: ICD-10-CM

## 2024-09-09 DIAGNOSIS — M54.42 CHRONIC BILATERAL LOW BACK PAIN WITH BILATERAL SCIATICA: ICD-10-CM

## 2024-09-09 DIAGNOSIS — G89.29 CHRONIC BILATERAL LOW BACK PAIN WITH BILATERAL SCIATICA: ICD-10-CM

## 2024-09-09 DIAGNOSIS — M54.41 CHRONIC BILATERAL LOW BACK PAIN WITH BILATERAL SCIATICA: ICD-10-CM

## 2024-09-09 DIAGNOSIS — R26.89 IMBALANCE: Primary | ICD-10-CM

## 2024-09-09 PROCEDURE — 97530 THERAPEUTIC ACTIVITIES: CPT | Mod: GP

## 2024-09-09 PROCEDURE — 97112 NEUROMUSCULAR REEDUCATION: CPT | Mod: GP

## 2024-09-09 PROCEDURE — 97162 PT EVAL MOD COMPLEX 30 MIN: CPT | Mod: GP

## 2024-09-09 ASSESSMENT — PAIN - FUNCTIONAL ASSESSMENT: PAIN_FUNCTIONAL_ASSESSMENT: 0-10

## 2024-09-10 PROBLEM — G89.29 CHRONIC BILATERAL LOW BACK PAIN WITH BILATERAL SCIATICA: Status: ACTIVE | Noted: 2024-09-10

## 2024-09-10 PROBLEM — M54.41 CHRONIC BILATERAL LOW BACK PAIN WITH BILATERAL SCIATICA: Status: ACTIVE | Noted: 2024-09-10

## 2024-09-10 PROBLEM — R26.2 DIFFICULTY WALKING: Status: ACTIVE | Noted: 2024-09-10

## 2024-09-10 PROBLEM — R26.89 IMBALANCE: Status: ACTIVE | Noted: 2024-09-10

## 2024-09-10 PROBLEM — M54.42 CHRONIC BILATERAL LOW BACK PAIN WITH BILATERAL SCIATICA: Status: ACTIVE | Noted: 2024-09-10

## 2024-09-10 ASSESSMENT — ENCOUNTER SYMPTOMS
PAIN SCALE: 2
PAIN SCALE AT LOWEST: 1
OCCASIONAL FEELINGS OF UNSTEADINESS: 1
PAIN SCALE AT HIGHEST: 7
LOSS OF SENSATION IN FEET: 1

## 2024-09-10 ASSESSMENT — ACTIVITIES OF DAILY LIVING (ADL): POOR_BALANCE: 1

## 2024-09-11 ENCOUNTER — APPOINTMENT (OUTPATIENT)
Dept: SPEECH THERAPY | Facility: CLINIC | Age: 74
End: 2024-09-11
Payer: MEDICARE

## 2024-09-11 ENCOUNTER — TREATMENT (OUTPATIENT)
Dept: PHYSICAL THERAPY | Facility: CLINIC | Age: 74
End: 2024-09-11
Payer: MEDICARE

## 2024-09-11 DIAGNOSIS — R26.2 DIFFICULTY WALKING: ICD-10-CM

## 2024-09-11 DIAGNOSIS — R26.89 IMBALANCE: Primary | ICD-10-CM

## 2024-09-11 DIAGNOSIS — M54.41 CHRONIC BILATERAL LOW BACK PAIN WITH BILATERAL SCIATICA: ICD-10-CM

## 2024-09-11 DIAGNOSIS — M54.42 CHRONIC BILATERAL LOW BACK PAIN WITH BILATERAL SCIATICA: ICD-10-CM

## 2024-09-11 DIAGNOSIS — G89.29 CHRONIC BILATERAL LOW BACK PAIN WITH BILATERAL SCIATICA: ICD-10-CM

## 2024-09-11 PROCEDURE — 97140 MANUAL THERAPY 1/> REGIONS: CPT | Mod: GP

## 2024-09-11 PROCEDURE — 97530 THERAPEUTIC ACTIVITIES: CPT | Mod: GP

## 2024-09-11 PROCEDURE — 97110 THERAPEUTIC EXERCISES: CPT | Mod: GP

## 2024-09-11 ASSESSMENT — PAIN SCALES - GENERAL: PAINLEVEL_OUTOF10: 4

## 2024-09-11 ASSESSMENT — PAIN - FUNCTIONAL ASSESSMENT: PAIN_FUNCTIONAL_ASSESSMENT: 0-10

## 2024-09-11 NOTE — PROGRESS NOTES
"Physical Therapy Treatment    Patient Name: Linda Garibay  MRN: [id number]  Encounter date: 9/11/2024    Time Calculation  Start Time: 0830  Stop Time: 0915  Time Calculation (min): 45 min  PT Therapeutic Procedures Time Entry  Manual Therapy Time Entry: 8  Neuromuscular Re-Education Time Entry: 5  Therapeutic Exercise Time Entry: 20  Therapeutic Activity Time Entry: 8    Visit # 2 of 10  Visits/Dates Authorized: NO AUTH / [place] VISITS     Current Problem:   Problem List Items Addressed This Visit             ICD-10-CM    Imbalance - Primary R26.89    Difficulty walking R26.2    Chronic bilateral low back pain with bilateral sciatica M54.42, M54.41, G89.29     Precautions:    spondylolisthesis, fall risk  Past Medical History Relevant to Rehab: HTN, R TKR 1/2020       Subjective   General:   General Comment: Doing ok since last session, hard to get comfortable with R knee pain though. Didn't feel she could lois SI belt securely. Did not yet attempt taking a walk. Pulled weeds in yard etc but without belt due to not staying on.    Pre-Treatment Symptoms:   Pain Assessment: 0-10  0-10 (Numeric) Pain Score: 4 (1-7/10 R>L knee, back)    Objective   Findings:    Neutral pelvis  TTP L peripatellar area, PF jt hypomobile alon superiorly but also M/L  Lacks 10 degrees L knee ext, painful flexion as well  Not TTP R peripatellar       Treatments:   Neuromuscular Re-Ed:   Reviewed MET for R anterior innominate     Ther-Act:  Reviewed donning Serola SI belt supine, able to pull tension effectively, sense of support/confidence in walking reported, instructed donning/doffing.  Instructed and applied kinesiology taping L PFjt \"U\" strip to increase superior glide, R knee anteromedial and anterolateral \"I\" strips.      Therapeutic Exercise: vc/tc throughout for lumbopelvic stabilization, SI belt on throughout  Hookly hip abd/add green tband/pillow  Iso abs hookly march  Tball bridge small ROM  Tball LTR  Tball DKTC    Gait " Training:    Manual Therapy:   L patella mobs, proximal tib-fib mobs    Modalities:       Assessment:   PT Assessment  Assessment Comment: Pt receptive to instruction, multiple orthopedic issues negatively influence each other.    Post-Treatment Symptoms:   Response to Interventions: Serola SI belt donned correctly yields sense of support.    Plan:    Advance to ability, determine benefit of taping, repeat instruction for home use.    Goals:   Active       PT Problem       PT Goals       Start:  09/09/24    Expected End:  12/08/24       1) Indep HEP and progression.  2) ABC Scale improved to at least 80% from 59% at eval.  3) Pt will perform home and yard tasks ad emily without symptom exacerbation.  4) Pt will walk for leisure/wellness ad emily without symptom exacerbation falls/near falls         Patient Stated Goal 1       Start:  09/10/24    Expected End:  12/08/24

## 2024-09-13 ENCOUNTER — TREATMENT (OUTPATIENT)
Dept: PHYSICAL THERAPY | Facility: CLINIC | Age: 74
End: 2024-09-13
Payer: MEDICARE

## 2024-09-13 DIAGNOSIS — R26.2 DIFFICULTY WALKING: ICD-10-CM

## 2024-09-13 DIAGNOSIS — M54.41 CHRONIC BILATERAL LOW BACK PAIN WITH BILATERAL SCIATICA: ICD-10-CM

## 2024-09-13 DIAGNOSIS — R26.89 IMBALANCE: ICD-10-CM

## 2024-09-13 DIAGNOSIS — G89.29 CHRONIC BILATERAL LOW BACK PAIN WITH BILATERAL SCIATICA: ICD-10-CM

## 2024-09-13 DIAGNOSIS — M54.42 CHRONIC BILATERAL LOW BACK PAIN WITH BILATERAL SCIATICA: ICD-10-CM

## 2024-09-13 PROCEDURE — 97110 THERAPEUTIC EXERCISES: CPT | Mod: GP

## 2024-09-13 PROCEDURE — 97140 MANUAL THERAPY 1/> REGIONS: CPT | Mod: GP

## 2024-09-13 PROCEDURE — 97530 THERAPEUTIC ACTIVITIES: CPT | Mod: GP

## 2024-09-13 ASSESSMENT — PAIN - FUNCTIONAL ASSESSMENT: PAIN_FUNCTIONAL_ASSESSMENT: 0-10

## 2024-09-13 ASSESSMENT — PAIN SCALES - GENERAL: PAINLEVEL_OUTOF10: 4

## 2024-09-13 NOTE — PROGRESS NOTES
"Physical Therapy Treatment    Patient Name: Linda Garibay  MRN: [id number]  Encounter date: 9/13/2024    Time Calculation  Start Time: 0745  Stop Time: 0845  Time Calculation (min): 60 min  PT Modalities Time Entry  Infrared Time Entry: 10  PT Therapeutic Procedures Time Entry  Manual Therapy Time Entry: 5  Therapeutic Exercise Time Entry: 20  Therapeutic Activity Time Entry: 15    Visit # 3 of 10  Visits/Dates Authorized: NO AUTH / [place] VISITS     Current Problem:   Problem List Items Addressed This Visit             ICD-10-CM    Imbalance R26.89    Difficulty walking R26.2    Chronic bilateral low back pain with bilateral sciatica M54.42, M54.41, G89.29     Precautions:    spondylolisthesis, fall risk  Past Medical History Relevant to Rehab: HTN, R TKR 1/2020       Subjective   General:   General Comment: Took a walk evening of last appt, 11-13k steps (smartwatch and pedometer disagree), normal average 7-8k, felt miserable that night with back pain and B knee pain. May have been walking off due to R hammertoe rubbing shoe. Did have a LOB a couple times she could catch herself to recover though.    Pre-Treatment Symptoms:   Pain Assessment: 0-10  0-10 (Numeric) Pain Score: 4    Objective   Findings:    Neutral pelvis  Less TTP L peripatellar area, PF jt hypomobile alon superiorly but also M/L, less vs prior visit.  Lacks 10 degrees L knee ext  Not TTP R peripatellar       Treatments:   Neuromuscular Re-Ed:     Ther-Act:  applied kinesiology taping L PFjt \"U\" strip to increase superior glide, R knee anteromedial and anterolateral \"I\" strips.  Educated pt re footwear/orthotics influence on symptoms and balance      Therapeutic Exercise: vc/tc throughout for lumbopelvic stabilization, SI belt on throughout  Anti-rotation stir the pot tband blue  Anti-rotation paloff press tband blue  QS with heel prop    Reviewed HEP, has ball now but larger  Hookly hip abd/add green tband/pillow  Iso abs hookly march  Tball bridge " small ROM  Tball LTR  Tball DKTC    Gait Training:    Manual Therapy:   L patella mobs    Deferred: proximal tib-fib mobs    Modalities:   Light Therapy: Infrared/Red wave length; 10J/cm2 applied with pads; applied to R/L anteromedial knees, in Seated      Assessment:   PT Assessment  Assessment Comment: Pt with increased activity level but not worst symptoms. Does report standing, e.g. holiday meal prep, worse than movement for pain. Pt would benefit from optimizing footwear and orthotics to reduce excessive lower kinematic chain rotatory forces for knee and back symptoms as well as balance. Pt to look for orthotics used remotely and bring athletic shoes (sandals today). Determine response to light therapy.    Post-Treatment Symptoms:   Response to Interventions: patella mobs felt good    Plan:    Advance to ability, determine benefit of taping, repeat instruction for home use.    Goals:   Active       PT Problem       PT Goals       Start:  09/09/24    Expected End:  12/08/24       1) Indep HEP and progression.  2) ABC Scale improved to at least 80% from 59% at eval.  3) Pt will perform home and yard tasks ad emily without symptom exacerbation.  4) Pt will walk for leisure/wellness ad emily without symptom exacerbation falls/near falls         Patient Stated Goal 1       Start:  09/10/24    Expected End:  12/08/24

## 2024-09-18 ENCOUNTER — APPOINTMENT (OUTPATIENT)
Dept: SPEECH THERAPY | Facility: CLINIC | Age: 74
End: 2024-09-18
Payer: MEDICARE

## 2024-09-20 ENCOUNTER — APPOINTMENT (OUTPATIENT)
Dept: SPEECH THERAPY | Facility: CLINIC | Age: 74
End: 2024-09-20
Payer: MEDICARE

## 2024-09-20 DIAGNOSIS — R49.0 HOARSENESS: ICD-10-CM

## 2024-09-20 PROCEDURE — 92507 TX SP LANG VOICE COMM INDIV: CPT | Mod: GN | Performed by: SPEECH-LANGUAGE PATHOLOGIST

## 2024-09-20 NOTE — PROGRESS NOTES
Speech-Language Pathology    Voice Treatment  & Discharge Summary    Patient Name: Linda Garibay  MRN: [id number]  Today's Date: 9/20/2024     Time Calculation  Start Time: 1345  Stop Time: 1420  Time Calculation (min): 35 min      Current Problem:  1. Hoarseness  Follow Up In Speech Therapy            Discharge Summary:   Discharge Information: Date of discharge 9/20/24, Date of last visit 9/20/24, Date of evaluation 8/20/24, Number of attended visits 4, Referred by Dr. Villalpando, and Referred for Hoarseness    Therapy Summary: Linda participated in an individualized treatment program to address chronic vocal hoarseness. She was engaged in diaphragmatic breathing exercises, vocal function exercises, and conversation training therapy to address vocal hoarseness. She responded well to techniques within the therapy room, and was able to achieve improved voicing and vocal quality. Linda demonstrates good awareness of recommended techniques and demonstrates adequate skills for self management moving forward. Linda reports feeling notable progress on her voice goals with the help of speech therapy. While she acknowledges need for further progress and continued practice, she feels prepared to do so with the skills discussed throughout the course of therapy. Linda is in agreement with discharge at this time, and is encouraged to contact SLP for any further questions/concerns. A miantenence plan was reviewed to faciliate ongoing progress following discharge.     Discharge Status: Goals achieved     Rehab Discharge Reason: Achieved all and/or the most significant goals(s)    Treatment Summary    Voice Assessment:  Linda participated in review of vocal function exercises and discussion of plan of care,     Voice Plan of Care:  Follow up 1x per week for 4 total visits.   Visits completed: 3/4    Discharge speech therapy.     Subjective: Pt is pleasant and participative. She reports improved follow through with HEP.         "Objective   GRBAS Scale  Patient was assess for vocal hoarseness as characterized using the scale of Grade, Roughness, Breathiness, Asthenia, and Strain (GRBAS).   0 - normal, 1 - slightly impaired, 2- moderately impaired, 3 - extremely impaired.                  stGstrstastdstest:st st1st Roughness: 0              Breathiness: 0              Asthenia: 0              Strain: 0     Qualitative notes:   9/20/24  -Notices that she has improved vocal quality   -MPT 16 seconds  -Diaphragm breathing  -s/z ratio: 16 seconds/17 seconds   -Clear pitch glides    9/4/24  -Overall, voice sounds clear and WNL today.  - Pt rates her voice at 80% today (if 100% is \"perfect)    8/15/24  - MPT - 13 seconds   - Clavicular breathing style   - S/Z - 16 seconds/16 seconds = 1.0  - Glottal naidu vocal quality noted throughout  - Pitch breaks on vocal glide     Behavioral notes:  9/20/24:  -Pt reports decrease in the amount of effort that she needs to take with her voice  -Feels her voice is more full, but still a bit \"gravely\"  -Noticed a relief of morning hoarseness  -Has taken to taking TUMS before bed after noticing a globus sensation in the evenings. Feels like this has helped with the globus sensation as well as morning hoarseness    9/4/24:  - Has been keeping up with breathing exercises, but maybe not \"as much as she should\"  - Hasn't followed through much with the CTT approaches discussed last week.     8/27/24:   -Keeping more hydrated  -Been talking more, which she feels helps the strength of her voice  -Rates her overall vocal quality today as 70% (with 100% being \"normal\" baseline function).        8/15/24  - Softer in the morning, takes time to \"build up strength\"  - Becomes stronger when using it more through the day  - Softness varies  - Drinks 3-4 12 oz cups coffee daily   - No carbonated beverages  - Aims for 64 oz of water daily, but probably doesn't drink that much   - No smoking  - Alcohol socially  - First thing in the " "morning, voice is \"weak\", but it doesn't last long.   - Has to \"put more effort behind it\"   - No throat irritation or significant post nasal drip  - Reports no throat clearing or chronic coughing - but does clear throat many times throughout evaluation.   - \"probably\" has reflux, comes intermittently when she has certain foods  - Can't humm anymore   - Can't sing now, says it \"sounds like a frog\"     Goals:   LTG 1: Pt to improve vocal quality and endurance for adequate vocalization across activities of daily living as evidenced by reduction in score of pt-reported outcome measures to within normal limits.  (Date initiated: 8/20/24; target date: 10/20/24)  STG 1: Pt to demonstrate understanding and adequate follow through of vocal hygiene program with pt report of 85% follow through across environments  Date initiated: 8/20/24   Target date: 9/20/24              Baseline: 0%, not aware              Status: GOAL MET              Progress this visit: N/A  STG 2: Pt to commit to and demonstrate functional completion of behavioral changes to help improve vocal function with pt report of 85% follow through across environments.   Date initiated: 8/20/24   Target date: 9/20/24              Baseline: 0%, not aware              Status: Goal met              Progress this visit: Pt recalls recommended behavioral modifications as discussed throughout POC and demonstrates adequate skills and knowledge for continued self management.   STG 3: Pt to participate in diaphragmatic breathing exercises to help support a sustained phonation timing of 12 seconds with adequate vocal quality.   Date initiated: 8/20/24   Target date: 9/20/24              Baseline: Unable              Status: GOAL MET               Progress this visit: Pt recalls recommended diaphragmatic breathing exercises as discussed throughout POC and demonstrates adequate skills and knowledge for continued self management. Completes a sustained phonation timing of 16 " seconds on this date.     OP Education:  Pt was engaged in discussion of plan of care, home exercise program, and functional maintenance plan. Discussed strategies for integrating CTT techniques to become more automatic. Verbalized understanding is noted. She is in agreement with discharge at this time.

## 2024-09-27 ENCOUNTER — TREATMENT (OUTPATIENT)
Dept: PHYSICAL THERAPY | Facility: CLINIC | Age: 74
End: 2024-09-27
Payer: MEDICARE

## 2024-09-27 DIAGNOSIS — M54.41 CHRONIC BILATERAL LOW BACK PAIN WITH BILATERAL SCIATICA: ICD-10-CM

## 2024-09-27 DIAGNOSIS — R26.89 IMBALANCE: ICD-10-CM

## 2024-09-27 DIAGNOSIS — M54.42 CHRONIC BILATERAL LOW BACK PAIN WITH BILATERAL SCIATICA: ICD-10-CM

## 2024-09-27 DIAGNOSIS — R26.2 DIFFICULTY WALKING: ICD-10-CM

## 2024-09-27 DIAGNOSIS — G89.29 CHRONIC BILATERAL LOW BACK PAIN WITH BILATERAL SCIATICA: ICD-10-CM

## 2024-09-27 PROCEDURE — 97035 APP MDLTY 1+ULTRASOUND EA 15: CPT | Mod: GP,CQ

## 2024-09-27 PROCEDURE — 97110 THERAPEUTIC EXERCISES: CPT | Mod: GP,CQ

## 2024-09-27 ASSESSMENT — PAIN SCALES - GENERAL: PAINLEVEL_OUTOF10: 5 - MODERATE PAIN

## 2024-09-27 ASSESSMENT — PAIN - FUNCTIONAL ASSESSMENT: PAIN_FUNCTIONAL_ASSESSMENT: 0-10

## 2024-09-27 NOTE — PROGRESS NOTES
"Physical Therapy Treatment    Patient Name: Linda Garibay  MRN: [id number]  Encounter date: 9/27/2024 PT Received On: 09/27/24  Time Calculation  Start Time: 0915  Stop Time: 1005  Time Calculation (min): 50 min  PT Modalities Time Entry  Ultrasound Time Entry: 10  PT Therapeutic Procedures Time Entry  Therapeutic Exercise Time Entry: 30    Visit # 4 of 10  Visits/Dates Authorized: NO AUTH / [place] VISITS     Current Problem:   Problem List Items Addressed This Visit             ICD-10-CM    Imbalance R26.89    Difficulty walking R26.2    Chronic bilateral low back pain with bilateral sciatica M54.42, M54.41, G89.29       Precautions:    spondylolisthesis, fall risk  Past Medical History Relevant to Rehab: HTN, R TKR 1/2020       Subjective   General: Back pain is so sore today did some pulling  of plants the other day and legs stiff just not having a great day       Pre-Treatment Symptoms:   Pain Assessment: 0-10  0-10 (Numeric) Pain Score: 5 - Moderate pain    Objective   Findings:   Difficulty with transfers  Tight lumbar mm noted         Neutral pelvis  Less TTP L peripatellar area, PF jt hypomobile alon superiorly but also M/L, less vs prior visit.  Lacks 10 degrees L knee ext  Not TTP R peripatellar       Treatments:   Neuromuscular Re-Ed:     Ther-Act:      Patient like the tape but still had excess tape residue on knee will get that off before next session and will reteach  for home taping      applied kinesiology taping L PFjt \"U\" strip to increase superior glide, R knee anteromedial and anterolateral \"I\" strips.  Educated pt re footwear/orthotics influence on symptoms and balance      Therapeutic Exercise:   Today used MHP due to increase symptoms today  Nustep level 4 9minutes  LAQ  Hookly hip abd/add green tband/pillow  Iso abs hookly march  Tball bridge small ROM  Tball LTR  Tball DKTC  Discussed how to manage activity at home and to not overdo will need continued strength and stamina        Manual " Therapy: DNP  L patella mobs      Modalities: DNP  Light Therapy: Infrared/Red wave length; 10J/cm2 applied with pads; applied to R/L anteromedial knees, in Seated    Ultrasound: Continuous at 100%, 1mHz at 1.5w/cm2, applied to lumbar, in L sidelying, for 10 minutes   Assessment:    Had good resonse to treatment. Discussed at length to not over do repettive activity until we build strength and tolerance. Needs improved LE and core strength to help achieve that .    Post-Treatment Symptoms:   Response to Interventions: better after session    Plan:    Advance to ability, determine benefit of taping, repeat instruction for home use.    Goals:   Active       PT Problem       PT Goals       Start:  09/09/24    Expected End:  12/08/24       1) Indep HEP and progression.  2) ABC Scale improved to at least 80% from 59% at eval.  3) Pt will perform home and yard tasks ad emily without symptom exacerbation.  4) Pt will walk for leisure/wellness ad emily without symptom exacerbation falls/near falls         Patient Stated Goal 1       Start:  09/10/24    Expected End:  12/08/24

## 2024-10-01 ENCOUNTER — APPOINTMENT (OUTPATIENT)
Dept: PHYSICAL THERAPY | Facility: CLINIC | Age: 74
End: 2024-10-01
Payer: MEDICARE

## 2024-10-03 ENCOUNTER — TREATMENT (OUTPATIENT)
Dept: PHYSICAL THERAPY | Facility: CLINIC | Age: 74
End: 2024-10-03
Payer: MEDICARE

## 2024-10-03 DIAGNOSIS — M54.41 CHRONIC BILATERAL LOW BACK PAIN WITH BILATERAL SCIATICA: ICD-10-CM

## 2024-10-03 DIAGNOSIS — R26.89 IMBALANCE: ICD-10-CM

## 2024-10-03 DIAGNOSIS — R26.2 DIFFICULTY WALKING: ICD-10-CM

## 2024-10-03 DIAGNOSIS — M54.42 CHRONIC BILATERAL LOW BACK PAIN WITH BILATERAL SCIATICA: ICD-10-CM

## 2024-10-03 DIAGNOSIS — G89.29 CHRONIC BILATERAL LOW BACK PAIN WITH BILATERAL SCIATICA: ICD-10-CM

## 2024-10-03 PROCEDURE — 97110 THERAPEUTIC EXERCISES: CPT | Mod: GP

## 2024-10-03 ASSESSMENT — PAIN - FUNCTIONAL ASSESSMENT: PAIN_FUNCTIONAL_ASSESSMENT: 0-10

## 2024-10-03 ASSESSMENT — PAIN SCALES - GENERAL: PAINLEVEL_OUTOF10: 5 - MODERATE PAIN

## 2024-10-03 NOTE — PROGRESS NOTES
Physical Therapy Treatment    Patient Name: Linda Garibay  MRN: [id number]  Encounter date: 10/3/2024    Time Calculation  Start Time: 1025  Stop Time: 1110  Time Calculation (min): 45 min  PT Therapeutic Procedures Time Entry  Therapeutic Exercise Time Entry: 40    Visit # 5 of 10  Visits/Dates Authorized: NO AUTH / [place] VISITS     Current Problem:   Problem List Items Addressed This Visit             ICD-10-CM    Imbalance R26.89    Difficulty walking R26.2    Chronic bilateral low back pain with bilateral sciatica M54.42, M54.41, G89.29       Precautions:    spondylolisthesis, fall risk  Past Medical History Relevant to Rehab: HTN, R TKR 1/2020       Subjective   General:  General Comment: Increased back pain last night, had pulled weeds 30-45 min. Better after laying down. Knees hurt after exercise bike, B anteriorly/around front of knees, better later. But today L shoulder hurting. Still with taping residue B knees, did not yet try oil to remove it. Has been hesitant to go for a walk, at times [employer] buggies come through, has to move quickly. Some walking yard though.    Pre-Treatment Symptoms:   Pain Assessment: 0-10  0-10 (Numeric) Pain Score: 5 - Moderate pain    Objective   Findings:    Mildly antalgic gait   Tape residue B knees persists    Treatments:   Therapeutic Exercise:   Hookly hip abd/add green tband/pillow  Iso abs hookly march  Tball bridge small ROM 20  Tball LTR 30  Tball DKTC 30  Tball ab isometric hooklying 20  F.T. core: stir the pot CW/CCW R/L, Paloff press R/L, alt shoulder ext R/L 5# x12 ea  Rogue band resisted march in place FW/BW purple to fatigue  RB ML static for pelvohip stability    Deferred:  Nustep L4 9minutes  LAQ    Manual Therapy:   Deferred: L patella mobs      Modalities:   Deferred: Light Therapy: Infrared/Red wave length; 10J/cm2 applied with pads; applied to R/L anteromedial knees, in Seated    Deferred: Ultrasound: Continuous at 100%, 1mHz at 1.5w/cm2, applied to lumbar,  "in L sidelying, for 10 minutes     Neuromuscular Re-Ed:       Ther-Act:   Deferred: kinesiology taping L PFjt \"U\" strip to increase superior glide, R knee anteromedial and anterolateral \"I\" strips.  Educated pt re footwear/orthotics influence on symptoms and balance    Assessment:   PT Assessment  Assessment Comment: Pt with various orthopedic issues affecting activity level. Good effort with exercise, intermittant cues for pace/technique needed. Needs continued supervised progression of exercise and techniques to address pain as needed.    Post-Treatment Symptoms:   Response to Interventions: variable pain knees/LEs    Plan:    Advance to ability.    Goals:   Active       PT Problem       PT Goals       Start:  09/09/24    Expected End:  12/08/24       1) Indep HEP and progression.  2) ABC Scale improved to at least 80% from 59% at eval.  3) Pt will perform home and yard tasks ad emily without symptom exacerbation.  4) Pt will walk for leisure/wellness ad emily without symptom exacerbation falls/near falls         Patient Stated Goal 1       Start:  09/10/24    Expected End:  12/08/24              "

## 2024-10-07 ENCOUNTER — APPOINTMENT (OUTPATIENT)
Dept: PHYSICAL THERAPY | Facility: CLINIC | Age: 74
End: 2024-10-07
Payer: MEDICARE

## 2024-10-10 ENCOUNTER — TREATMENT (OUTPATIENT)
Dept: PHYSICAL THERAPY | Facility: CLINIC | Age: 74
End: 2024-10-10
Payer: MEDICARE

## 2024-10-10 DIAGNOSIS — M54.41 CHRONIC BILATERAL LOW BACK PAIN WITH BILATERAL SCIATICA: ICD-10-CM

## 2024-10-10 DIAGNOSIS — R26.89 IMBALANCE: ICD-10-CM

## 2024-10-10 DIAGNOSIS — G89.29 CHRONIC BILATERAL LOW BACK PAIN WITH BILATERAL SCIATICA: ICD-10-CM

## 2024-10-10 DIAGNOSIS — M54.42 CHRONIC BILATERAL LOW BACK PAIN WITH BILATERAL SCIATICA: ICD-10-CM

## 2024-10-10 DIAGNOSIS — R26.2 DIFFICULTY WALKING: ICD-10-CM

## 2024-10-10 PROCEDURE — 97112 NEUROMUSCULAR REEDUCATION: CPT | Mod: GP

## 2024-10-10 PROCEDURE — 97530 THERAPEUTIC ACTIVITIES: CPT | Mod: GP

## 2024-10-10 ASSESSMENT — PAIN - FUNCTIONAL ASSESSMENT: PAIN_FUNCTIONAL_ASSESSMENT: 0-10

## 2024-10-10 NOTE — PROGRESS NOTES
Physical Therapy Treatment    Patient Name: Linda Garibay  MRN: [id number]  Encounter date: 10/10/2024    Time Calculation  Start Time: 1003  Stop Time: 1045  Time Calculation (min): 42 min  PT Therapeutic Procedures Time Entry  Neuromuscular Re-Education Time Entry: 15  Therapeutic Activity Time Entry: 25    Visit # 6 of 10  Visits/Dates Authorized: NO AUTH / [place] VISITS     Current Problem:   Problem List Items Addressed This Visit             ICD-10-CM    Imbalance R26.89    Difficulty walking R26.2    Chronic bilateral low back pain with bilateral sciatica M54.42, M54.41, G89.29       Precautions:    spondylolisthesis, fall risk  Past Medical History Relevant to Rehab: HTN, R TKR 1/2020       Subjective   General:  General Comment: Brought old custom orthotics as discussed.Has not worn them for years. Replacement pair didn't seem as good as 1st pair. Feeling some improvement overall since attending PT.    Pre-Treatment Symptoms:   Pain Assessment: 0-10  0-10 (Numeric) Pain Score:  (mild to moderate back pain/knee pain)    Objective   Findings:    Mildly antalgic gait   Tape residue B knees persists    Treatments:   Neuromuscular Re-Ed:   foam DLS EC head turns, head nods, weight shift  RB AP shift/static  RB ML  Gait head nods, nos, diagonals  Gait 360 turns CW/CCW    Ther-Act:   Reviewed influence of footwear/orthotics influence on symptoms and balance. Pt presents with 2 pairs of custom orthotics, rigid 3/4 length with foam/leather type full length overlay. Older pair have flat bottom under heel, newer pair has rounded bottom. Educated pt re benefit of flat bottom to avoid overpowering orthotic relative to shoe. Pt with heavy wear on shoe insoles at met heads, educated pt re purpose of metatarsal raises on custom orthotics. Overlay of custom orthotics heavily worn, numerous cracks, pt agreed to removal of overlay and placement of shoe insoles on top of rigid 3/4 orthotic. Added metatarsal raises to insoles.  "Initial sense of heel rising in shoe, resolved with retying and continued wear likely compressing insole into heel cup of rigid orthotic.    Deferred: kinesiology taping L PFjt \"U\" strip to increase superior glide, R knee anteromedial and anterolateral \"I\" strips.    Therapeutic Exercise:   Hookly hip abd/add green tband/pillow  Iso abs hookly march  Tball bridge small ROM 20  Tball LTR 30  Tball DKTC 30  Tball ab isometric hooklying 20  F.T. core: stir the pot CW/CCW R/L, Paloff press R/L, alt shoulder ext R/L 5# x12 ea  Rogue band resisted march in place FW/BW purple to fatigue  RB ML static for pelvohip stability    Deferred:  Nustep L4 9minutes  LAQ    Manual Therapy:   Deferred: L patella mobs      Modalities:   Deferred: Light Therapy: Infrared/Red wave length; 10J/cm2 applied with pads; applied to R/L anteromedial knees, in Seated    Deferred: Ultrasound: Continuous at 100%, 1mHz at 1.5w/cm2, applied to lumbar, in L sidelying, for 10 minutes     Assessment:   PT Assessment  Assessment Comment: Improved SLS control with orthotics. Challenged with dynamic gait and balance tasks.    Post-Treatment Symptoms:   Response to Interventions: no change in pain, able to provoke imbalance with dynamic gait drills    Plan:    Advance to ability.    Goals:   Active       PT Problem       PT Goals       Start:  09/09/24    Expected End:  12/08/24       1) Indep HEP and progression.  2) ABC Scale improved to at least 80% from 59% at eval.  3) Pt will perform home and yard tasks ad emily without symptom exacerbation.  4) Pt will walk for leisure/wellness ad emily without symptom exacerbation falls/near falls         Patient Stated Goal 1       Start:  09/10/24    Expected End:  12/08/24              "

## 2024-10-14 ENCOUNTER — TREATMENT (OUTPATIENT)
Dept: PHYSICAL THERAPY | Facility: CLINIC | Age: 74
End: 2024-10-14
Payer: MEDICARE

## 2024-10-14 DIAGNOSIS — G89.29 CHRONIC BILATERAL LOW BACK PAIN WITH BILATERAL SCIATICA: ICD-10-CM

## 2024-10-14 DIAGNOSIS — M54.41 CHRONIC BILATERAL LOW BACK PAIN WITH BILATERAL SCIATICA: ICD-10-CM

## 2024-10-14 DIAGNOSIS — M54.42 CHRONIC BILATERAL LOW BACK PAIN WITH BILATERAL SCIATICA: ICD-10-CM

## 2024-10-14 DIAGNOSIS — R26.89 IMBALANCE: ICD-10-CM

## 2024-10-14 DIAGNOSIS — R26.2 DIFFICULTY WALKING: ICD-10-CM

## 2024-10-14 PROCEDURE — 97112 NEUROMUSCULAR REEDUCATION: CPT | Mod: GP

## 2024-10-14 PROCEDURE — 97110 THERAPEUTIC EXERCISES: CPT | Mod: GP

## 2024-10-14 ASSESSMENT — PAIN - FUNCTIONAL ASSESSMENT: PAIN_FUNCTIONAL_ASSESSMENT: 0-10

## 2024-10-14 ASSESSMENT — PAIN SCALES - GENERAL: PAINLEVEL_OUTOF10: 0 - NO PAIN

## 2024-10-14 NOTE — PROGRESS NOTES
"Physical Therapy Treatment    Patient Name: Linda Garibay  MRN: [id number]  Encounter date: 10/14/2024    Time Calculation  Start Time: 0920  Stop Time: 1000  Time Calculation (min): 40 min  PT Therapeutic Procedures Time Entry  Neuromuscular Re-Education Time Entry: 12  Therapeutic Exercise Time Entry: 28    Visit # 7 of 10  Visits/Dates Authorized: NO AUTH / [place] VISITS     Current Problem:   Problem List Items Addressed This Visit             ICD-10-CM    Imbalance R26.89    Difficulty walking R26.2    Chronic bilateral low back pain with bilateral sciatica M54.42, M54.41, G89.29       Precautions:    spondylolisthesis, fall risk  Past Medical History Relevant to Rehab: HTN, R TKR 1/2020       Subjective   General:  General Comment: A lot of yard work Sat, not hurting until nearing end of tasks but very painful that night, B knees/shins/dorsums of feet. Previously hurt more during tasks than near end of tasks. Did not think about using SI belt.    Pre-Treatment Symptoms:   Pain Assessment: 0-10  0-10 (Numeric) Pain Score: 0 - No pain    Objective   Findings:    Mildly antalgic gait       Treatments:   Neuromuscular Re-Ed:  foam DLS EC head turns, head nods, weight shift  RB AP shift/static  RB ML  Resisted gait EC FW/BW  Gait head nods, nos, diagonals  Gait 360 turns CW/CCW    Ther-Act:   kinesiology taping L PFjt \"U\" strip to increase superior glide, R knee anteromedial and anterolateral \"I\" strips.    Therapeutic Exercise:   Hookly hip abd/add green tband/pillow  Iso abs hookly march  Tball bridge small ROM 20  Tball LTR 30  Tball DKTC 30  Tball ab isometric hooklying 20  F.T. core: stir the pot CW/CCW R/L, Paloff press R/L, alt shoulder ext R/L 5# x12 ea  RB ML static for pelvohip stability    Deferred:  Rogue band resisted march in place FW/BW purple to fatigue  Nustep L4 9minutes  LAQ    Manual Therapy:   Deferred: L patella mobs      Modalities:   Deferred: Light Therapy: Infrared/Red wave length; 10J/cm2 " applied with pads; applied to R/L anteromedial knees, in Seated    Deferred: Ultrasound: Continuous at 100%, 1mHz at 1.5w/cm2, applied to lumbar, in L sidelying, for 10 minutes     HEP/Access Codes:  10/10/24 [id number] gait with head movement and gait with 360 turns  9/13/24 [id number] stir the pot, maryann  9/11/24 [id number] Tball supine, QS, hookly tband hip    Assessment:   PT Assessment  Assessment Comment: Able to advance strengthening and dynamic balance tasks. Further progress anticipated.    Post-Treatment Symptoms:   Response to Interventions: no pain onset    Plan:    Advance to ability.    Goals:   Active       PT Problem       PT Goals       Start:  09/09/24    Expected End:  12/08/24       1) Indep HEP and progression.  2) ABC Scale improved to at least 80% from 59% at eval.  3) Pt will perform home and yard tasks ad emily without symptom exacerbation.  4) Pt will walk for leisure/wellness ad emily without symptom exacerbation falls/near falls         Patient Stated Goal 1       Start:  09/10/24    Expected End:  12/08/24

## 2024-10-17 ENCOUNTER — TREATMENT (OUTPATIENT)
Dept: PHYSICAL THERAPY | Facility: CLINIC | Age: 74
End: 2024-10-17
Payer: MEDICARE

## 2024-10-17 DIAGNOSIS — G89.29 CHRONIC BILATERAL LOW BACK PAIN WITH BILATERAL SCIATICA: ICD-10-CM

## 2024-10-17 DIAGNOSIS — M54.42 CHRONIC BILATERAL LOW BACK PAIN WITH BILATERAL SCIATICA: ICD-10-CM

## 2024-10-17 DIAGNOSIS — R26.89 IMBALANCE: ICD-10-CM

## 2024-10-17 DIAGNOSIS — R26.2 DIFFICULTY WALKING: ICD-10-CM

## 2024-10-17 DIAGNOSIS — M54.41 CHRONIC BILATERAL LOW BACK PAIN WITH BILATERAL SCIATICA: ICD-10-CM

## 2024-10-17 PROCEDURE — 97112 NEUROMUSCULAR REEDUCATION: CPT | Mod: GP

## 2024-10-17 PROCEDURE — 97110 THERAPEUTIC EXERCISES: CPT | Mod: GP

## 2024-10-17 ASSESSMENT — PAIN SCALES - GENERAL: PAINLEVEL_OUTOF10: 0 - NO PAIN

## 2024-10-17 ASSESSMENT — PAIN - FUNCTIONAL ASSESSMENT: PAIN_FUNCTIONAL_ASSESSMENT: 0-10

## 2024-10-17 NOTE — PROGRESS NOTES
"Physical Therapy Treatment    Patient Name: Linda Garibay  MRN: [id number]  Encounter date: 10/17/2024    Time Calculation  Start Time: 0945  Stop Time: 1030  Time Calculation (min): 45 min  PT Therapeutic Procedures Time Entry  Neuromuscular Re-Education Time Entry: 25  Therapeutic Exercise Time Entry: 15  Therapeutic Activity Time Entry: 3    Visit # 8 of 10  Visits/Dates Authorized: NO AUTH / [place] VISITS     Current Problem:   Problem List Items Addressed This Visit             ICD-10-CM    Imbalance R26.89    Difficulty walking R26.2    Chronic bilateral low back pain with bilateral sciatica M54.42, M54.41, G89.29       Precautions:    spondylolisthesis, fall risk  Past Medical History Relevant to Rehab: HTN, R TKR 1/2020       Subjective   General:  General Comment: Feeling better less knee pain. Likes having taping. Does note less yard work than usual.    Pre-Treatment Symptoms:   Pain Assessment: 0-10  0-10 (Numeric) Pain Score: 0 - No pain    Objective   Findings:    More fluid gait  SI belt donned       Treatments:   Neuromuscular Re-Ed:  foam DLS EC head turns, head nods, weight shift  RB AP shift/static  RB ML  Resisted gait EC FW/BW  Gait head nods, nos, diagonals  Gait 360 turns CW/CCW    Ther-Act:   kinesiology taping L PFjt \"U\" strip to increase superior glide, R knee anteromedial and anterolateral \"I\" strips.    Therapeutic Exercise:   F.T. core: stir the pot CW/CCW R/L, Paloff press R/L, alt shoulder ext R/L 5# x12 ea      Deferred:  Hookly hip abd/add green tband/pillow  Iso abs hookly march  Tball bridge small ROM 20  Tball LTR 30  Tball DKTC 30  Tball ab isometric hooklying 20  Tloop side step green  Tloop FW/BW walk green  Rogue band resisted march in place FW/BW purple to fatigue  Nustep L4 9minutes  LAQ    Manual Therapy:   Deferred: L patella mobs      Modalities:   Deferred: Light Therapy: Infrared/Red wave length; 10J/cm2 applied with pads; applied to R/L anteromedial knees, in " Seated    Deferred: Ultrasound: Continuous at 100%, 1mHz at 1.5w/cm2, applied to lumbar, in L sidelying, for 10 minutes     HEP/Access Codes:  10/10/24 [id number] gait with head movement and gait with 360 turns  9/13/24 [id number] stir the potmaryann  9/11/24 [id number] Tball supine, QS, hookly tband hip    Assessment:   PT Assessment  Assessment Comment: Able to advance strengthening and dynamic balance tasks. Further progress anticipated.    Post-Treatment Symptoms:   Response to Interventions: some B knee soreness after series of exercises DLS on foam, improved afterwards    Plan:    Advance to ability.    Goals:   Active       PT Problem       PT Goals       Start:  09/09/24    Expected End:  12/08/24       1) Indep HEP and progression.  2) ABC Scale improved to at least 80% from 59% at eval.  3) Pt will perform home and yard tasks ad emily without symptom exacerbation.  4) Pt will walk for leisure/wellness ad emily without symptom exacerbation falls/near falls         Patient Stated Goal 1       Start:  09/10/24    Expected End:  12/08/24

## 2024-10-21 ENCOUNTER — TREATMENT (OUTPATIENT)
Dept: PHYSICAL THERAPY | Facility: CLINIC | Age: 74
End: 2024-10-21
Payer: MEDICARE

## 2024-10-21 DIAGNOSIS — M54.42 CHRONIC BILATERAL LOW BACK PAIN WITH BILATERAL SCIATICA: ICD-10-CM

## 2024-10-21 DIAGNOSIS — R26.89 IMBALANCE: ICD-10-CM

## 2024-10-21 DIAGNOSIS — M54.41 CHRONIC BILATERAL LOW BACK PAIN WITH BILATERAL SCIATICA: ICD-10-CM

## 2024-10-21 DIAGNOSIS — R26.2 DIFFICULTY WALKING: ICD-10-CM

## 2024-10-21 DIAGNOSIS — G89.29 CHRONIC BILATERAL LOW BACK PAIN WITH BILATERAL SCIATICA: ICD-10-CM

## 2024-10-21 PROCEDURE — 97112 NEUROMUSCULAR REEDUCATION: CPT | Mod: GP

## 2024-10-21 ASSESSMENT — PAIN - FUNCTIONAL ASSESSMENT: PAIN_FUNCTIONAL_ASSESSMENT: 0-10

## 2024-10-21 ASSESSMENT — PAIN SCALES - GENERAL: PAINLEVEL_OUTOF10: 3

## 2024-10-21 NOTE — PROGRESS NOTES
"Physical Therapy Treatment    Patient Name: Linda Garibay  MRN: [id number]  Encounter date: 10/21/2024    Time Calculation  Start Time: 1045  Stop Time: 1128  Time Calculation (min): 43 min  PT Therapeutic Procedures Time Entry  Neuromuscular Re-Education Time Entry: 35  Therapeutic Exercise Time Entry: 5  Therapeutic Activity Time Entry: 2    Visit # 9 of 10  Visits/Dates Authorized: NO AUTH / [place] VISITS     Current Problem:   Problem List Items Addressed This Visit             ICD-10-CM    Imbalance R26.89    Difficulty walking R26.2    Chronic bilateral low back pain with bilateral sciatica M54.42, M54.41, G89.29       Precautions:    spondylolisthesis, fall risk  Past Medical History Relevant to Rehab: HTN, R TKR 1/2020       Subjective   General:  General Comment: Taping supportive, removed it last night. Small area of irritation L medial thigh, not where tape was applied, perhaps older adhesive residue. Sore today.    Pre-Treatment Symptoms:   Pain Assessment: 0-10  0-10 (Numeric) Pain Score: 3  Pain Location: Knee (pt rubbing R knee when therapist arrived to [facility])    Objective   Findings:    Mildly antalgic gait R stance         Treatments:   Neuromuscular Re-Ed:  foam DLS EC head turns, head nods, head diagonals, weight shift  RB AP shift/static  RB ML  Foam beam tandem walk, sidestep  Hurdles 6in FW, lateral    Deferred:  Resisted gait EC FW/BW  Gait head nods, nos, diagonals  Gait 360 turns CW/CCW    Ther-Act:   kinesiology taping R knee anteromedial and anterolateral \"I\" strips, deferred L PFjt \"U\" strip to increase superior glide,     Therapeutic Exercise:   F.T. core: stir the pot CW/CCW R/L, Paloff press R/L, alt shoulder ext R/L 5# x12 ea      Deferred:  Hookly hip abd/add green tband/pillow  Iso abs hookly march  Tball bridge small ROM 20  Tball LTR 30  Tball DKTC 30  Tball ab isometric hooklying 20  Tloop side step green  Tloop FW/BW walk green  Rogue band resisted march in place FW/BW purple to " fatigue  Nustep L4 9minutes  LAQ    Manual Therapy:   Deferred: L patella mobs      Modalities:   Deferred: Light Therapy: Infrared/Red wave length; 10J/cm2 applied with pads; applied to R/L anteromedial knees, in Seated    Deferred: Ultrasound: Continuous at 100%, 1mHz at 1.5w/cm2, applied to lumbar, in L sidelying, for 10 minutes     HEP/Access Codes:  10/10/24 [id number] gait with head movement and gait with 360 turns  9/13/24 [id number] stir the pot, paloff  9/11/24 [id number] Tball supine, QS, hookly tband hip    Assessment:        Post-Treatment Symptoms:   Response to Interventions: Better except some LBP with hip ext, better with smaller ROM    Plan:    Advance to ability.    Goals:   Active       PT Problem       PT Goals       Start:  09/09/24    Expected End:  12/08/24       1) Indep HEP and progression.  2) ABC Scale improved to at least 80% from 59% at eval.  3) Pt will perform home and yard tasks ad emily without symptom exacerbation.  4) Pt will walk for leisure/wellness ad emily without symptom exacerbation falls/near falls         Patient Stated Goal 1       Start:  09/10/24    Expected End:  12/08/24

## 2024-10-24 ENCOUNTER — TREATMENT (OUTPATIENT)
Dept: PHYSICAL THERAPY | Facility: CLINIC | Age: 74
End: 2024-10-24
Payer: MEDICARE

## 2024-10-24 DIAGNOSIS — G89.29 CHRONIC BILATERAL LOW BACK PAIN WITH BILATERAL SCIATICA: ICD-10-CM

## 2024-10-24 DIAGNOSIS — M54.42 CHRONIC BILATERAL LOW BACK PAIN WITH BILATERAL SCIATICA: ICD-10-CM

## 2024-10-24 DIAGNOSIS — M54.41 CHRONIC BILATERAL LOW BACK PAIN WITH BILATERAL SCIATICA: ICD-10-CM

## 2024-10-24 DIAGNOSIS — R26.2 DIFFICULTY WALKING: ICD-10-CM

## 2024-10-24 DIAGNOSIS — R26.89 IMBALANCE: ICD-10-CM

## 2024-10-24 PROCEDURE — 97112 NEUROMUSCULAR REEDUCATION: CPT | Mod: GP

## 2024-10-24 PROCEDURE — 97110 THERAPEUTIC EXERCISES: CPT | Mod: GP

## 2024-10-24 NOTE — PROGRESS NOTES
"Physical Therapy Treatment/Discharge Summary    Patient Name: Linda Garibay  MRN: [id number]  Encounter date: 10/24/2024    Time Calculation  Start Time: 0945  Stop Time: 1030  Time Calculation (min): 45 min  PT Therapeutic Procedures Time Entry  Neuromuscular Re-Education Time Entry: 30  Therapeutic Exercise Time Entry: 5  Therapeutic Activity Time Entry: 5    Visit # 10 of 10  Visits/Dates Authorized: NO AUTH / [place] VISITS     Current Problem:   Problem List Items Addressed This Visit             ICD-10-CM    Imbalance R26.89    Difficulty walking R26.2    Chronic bilateral low back pain with bilateral sciatica M54.42, M54.41, G89.29       Precautions:    spondylolisthesis, fall risk  Past Medical History Relevant to Rehab: HTN, R TKR 1/2020       Subjective   General:  General Comment: Pt satisfied with progress since start of PT, comfortable with today being last session. Does still have onset of B knee pain then pain into shins/dorsum of feet at times. Continues to like kinesiology taping for knees. Does forget to lois SI belt to support pelvis alignment.    Pre-Treatment Symptoms:   Pain Assessment: 0-10  0-10 (Numeric) Pain Score: 2  Pain Location: Knee    Objective   Findings:    Mildly asymmetrical gait, decreased R stance and decreased R knee flexion in swing phase initiating gait then improved    Other Measures  30 Second Sit to Stand: 12 (age/gender norm is 10)  Activities - Specific Balance Confidence Scale: 72% confidence, was 59% at eval (1150/1600)    Treatments:   Neuromuscular Re-Ed:  foam DLS EC head turns, head nods, head diagonals, weight shift  RB AP shift/static  RB ML  Foam beam tandem walk, sidestep  Hurdles 6in FW, lateral    Deferred:  Resisted gait EC FW/BW  Gait head nods, nos, diagonals  Gait 360 turns CW/CCW    Ther-Act:   kinesiology taping R knee anteromedial and anterolateral \"I\" strips, deferred L PFjt \"U\" strip to increase superior glide,     Therapeutic Exercise:   F.T. core: " stir the pot CW/CCW R/L, Paloff press R/L, alt shoulder ext R/L 5# x12 ea  Added sit to stand to HEP  Added SLS with orthotics to HEP  Reviewed HEP    Deferred:  Hookly hip abd/add green tband/pillow  Iso abs hookly march  Tball bridge small ROM 20  Tball LTR 30  Tball DKTC 30  Tball ab isometric hooklying 20  Tloop side step green  Tloop FW/BW walk green  Rogue band resisted march in place FW/BW purple to fatigue  Nustep L4 9minutes  LAQ    Manual Therapy:   reviewed: L julio mobs      HEP/Access Codes:  10/24/24: [id number] sit to stand, SLS  10/10/24 [id number] gait with head movement and gait with 360 turns  9/13/24 [id number] stir the pot, paloff  9/11/24 [id number] Tball supine, QS, hookly tband hip    Assessment:   PT Assessment  Assessment Comment: Able to advance strengthening and dynamic balance tasks. Further progress anticipated with adherence to HEP. Pt will benefit from ongoing use of orthotics and SI belt. Pt also intends to pursue vein care. Pt pleased with progress, d/c PT.  Date of discharge 10/24/24, Date of last visit 10/24, Date of evaluation 9/9/24, and Number of attended visits 10      Post-Treatment Symptoms:   Response to Interventions: better after exercise    Plan:    Discharge to indep HEP. Pt to contact dept with questions or concerns.    Goals:   Resolved       PT Problem       PT Goals (Adequate for Discharge)       Start:  09/09/24    Expected End:  12/08/24    Resolved:  10/26/24    Met 1) Indep HEP and progression.  Nearly met 2) ABC Scale improved to at least 80% from 59% at eval.  Partly met 3) Pt will perform home and yard tasks ad emily without symptom exacerbation.  Partly met 4) Pt will walk for leisure/wellness ad emily without symptom exacerbation falls/near falls         Patient Stated Goal 1 (Adequate for Discharge)       Start:  09/10/24    Expected End:  12/08/24    Resolved:  10/26/24

## 2024-10-26 ASSESSMENT — PAIN - FUNCTIONAL ASSESSMENT: PAIN_FUNCTIONAL_ASSESSMENT: 0-10

## 2024-10-26 ASSESSMENT — PAIN SCALES - GENERAL: PAINLEVEL_OUTOF10: 2

## 2024-11-21 ENCOUNTER — APPOINTMENT (OUTPATIENT)
Dept: AUDIOLOGY | Facility: CLINIC | Age: 74
End: 2024-11-21
Payer: MEDICARE

## 2024-11-21 ENCOUNTER — APPOINTMENT (OUTPATIENT)
Dept: OTOLARYNGOLOGY | Facility: CLINIC | Age: 74
End: 2024-11-21
Payer: MEDICARE

## 2024-11-21 VITALS — TEMPERATURE: 97.4 F | HEIGHT: 64 IN | WEIGHT: 193 LBS | BODY MASS INDEX: 32.95 KG/M2

## 2024-11-21 DIAGNOSIS — H90.3 BILATERAL SENSORINEURAL HEARING LOSS: Primary | ICD-10-CM

## 2024-11-21 DIAGNOSIS — H90.3 SENSORINEURAL HEARING LOSS (SNHL) OF BOTH EARS: Primary | ICD-10-CM

## 2024-11-21 DIAGNOSIS — R44.2 PHANTOSMIA: ICD-10-CM

## 2024-11-21 DIAGNOSIS — H93.13 BILATERAL TINNITUS: ICD-10-CM

## 2024-11-21 DIAGNOSIS — R49.0 HOARSENESS: ICD-10-CM

## 2024-11-21 DIAGNOSIS — R26.89 IMBALANCE: ICD-10-CM

## 2024-11-21 DIAGNOSIS — J34.2 DEVIATED NASAL SEPTUM: ICD-10-CM

## 2024-11-21 PROCEDURE — 1036F TOBACCO NON-USER: CPT | Performed by: OTOLARYNGOLOGY

## 2024-11-21 PROCEDURE — 99214 OFFICE O/P EST MOD 30 MIN: CPT | Performed by: OTOLARYNGOLOGY

## 2024-11-21 PROCEDURE — 1159F MED LIST DOCD IN RCRD: CPT | Performed by: OTOLARYNGOLOGY

## 2024-11-21 PROCEDURE — 3008F BODY MASS INDEX DOCD: CPT | Performed by: OTOLARYNGOLOGY

## 2024-11-21 PROCEDURE — 92550 TYMPANOMETRY & REFLEX THRESH: CPT | Performed by: AUDIOLOGIST

## 2024-11-21 PROCEDURE — 92557 COMPREHENSIVE HEARING TEST: CPT | Performed by: AUDIOLOGIST

## 2024-11-21 PROCEDURE — 1157F ADVNC CARE PLAN IN RCRD: CPT | Performed by: OTOLARYNGOLOGY

## 2024-11-21 NOTE — PROGRESS NOTES
Chief Complaint   Patient presents with    Follow-up     LOV 8/24 F/U AUDIO HEARING LOSS     HPI:  Linda Garibay is a 74 y.o. female who complains of some chronic problems with bilateral hearing loss.  Known history of bilateral sloping high-frequency sensorineural hearing loss last checked in 2019.    We did repeat hearing test today which continues to show bilateral sloping moderate to severe sensorineural hearing loss which is symmetric.    Has some wax impactions and itchy ears as well.  She also has some bilateral tinnitus which is longstanding.  She is also been having some hoarseness especially with singing over the past 2 to 3 years.  Some globus.  No sore throat, dysphagia, shortness of breath, coughing or throat clearing.  She may have some reflux.  Last visit she had normal scoping.  She has been doing speech therapy with good success.  Also feels that she gets off balance at times.  No sensation of movement or vertigo.  No lightheaded or near syncope.  She does have problems with some decrease sensation in her feet, as well as some lower extremity joint and back problems.  She has been doing balance therapy with great success.  On occasion she will have a phantom smell of smoke or chemicals.  She is not sure if it has something neighbors are burning or if it is truly a phantom smell.  No difficulty smelling normal things.  No significant nasal congestion.  No changes.  Does not wish any further evaluation.  This has been ongoing for at least 3 years.    PMH:  Past Medical History:   Diagnosis Date    Atypical ductal hyperplasia, breast     HTN (hypertension)      Past Surgical History:   Procedure Laterality Date    BI MAMMO GUIDED BREAST LEFT LOCALIZATION Left 10/10/2013    BI MAMMO GUIDED LOCALIZATION BREAST LEFT [facility] CLINICAL LEGACY    BREAST BIOPSY      BREAST LUMPECTOMY      TOTAL KNEE ARTHROPLASTY Right 01/06/2020         Medications:     Current Outpatient Medications:     losartan (Cozaar) 50  "mg tablet, Take 1 tablet (50 mg) by mouth once daily., Disp: 90 tablet, Rfl: 1    rosuvastatin (Crestor) 5 mg tablet, Take 1 tablet (5 mg) by mouth once daily., Disp: 90 tablet, Rfl: 1    pregabalin (Lyrica) 75 mg capsule, TAKE ONE CAPSULE BY MOUTH TWO TIMES A DAY (Patient not taking: Reported on 11/21/2024), Disp: 180 capsule, Rfl: 0     Allergies:  Allergies   Allergen Reactions    Ibuprofen Unknown    Nsaids (Non-Steroidal Anti-Inflammatory Drug) GI Upset        ROS:  Review of systems normal unless stated otherwise in the HPI and/or PMH.    Physical Exam:  Temperature 36.3 °C (97.4 °F), height 1.626 m (5' 4\"), weight 87.5 kg (193 lb). Body mass index is 33.13 kg/m².     GENERAL APPEARANCE: Well developed and well nourished.  Alert and oriented in no acute distress.  Normal vocal quality.      HEAD/FACE: No erythema or edema or facial tenderness.  Normal facial nerve function bilaterally.    EAR:       EXTERNAL: Normal pinnas and bilateral ear canals.  Mild wax removed bilaterally today.       MIDDLE EAR: Tympanic membranes intact and mobile with normal landmarks.  Middle ear space appears well aerated.       TUBE STATUS: N/A       MASTOID CAVITY: N/A       HEARING: Gross hearing assessment is within normal limits.      NOSE:       VISUALIZED USING: Anterior rhinoscopy with headlight and nasal speculum.  Flexible scoping       DORSUM: Midline, nontraumatic appearance.       MUCOSA: Normal-appearing.       SECRETIONS: Normal.       SEPTUM: Right deviation       INFERIOR TURBINATES: Normal.       MIDDLE TURBINATES/MEATUS: Normal       BLEEDING: N/A         ORAL CAVITY/PHARYNX:       TEETH: Adequate dentition.       TONGUE: No mass or lesion.  Normal mobility.       FLOOR OF MOUTH: No mass or lesion.       PALATE: Normal hard palate, soft palate, and uvula.       OROPHARYNX: Normal without mass or lesion.       BUCCAL MUCOSA/GBS: Normal without mass or lesion.       LIPS: Normal.    LARYNX/HYPOPHARYNX/NASOPHARYNX: " N/A    NECK: No palpable masses or abnormal adenopathy.  Trachea is midline.    THYROID: No thyromegaly or palpable nodule.    SALIVARY GLANDS: Normal bilateral parotid and submandibular glands by inspection and palpation.    TMJ's: Normal.    NEURO: Cranial nerve exam grossly normal bilaterally.       Assessment/Plan   Linda was seen today for follow-up.  Diagnoses and all orders for this visit:  Bilateral sensorineural hearing loss (Primary)  Bilateral tinnitus  Deviated nasal septum  Imbalance  Hoarseness  Phantosmia    Educated.  Consider hearing aids.  Check audiogram yearly.  Probably needs to follow-up about every 6 months for her wax impactions.  Continue speech and balance therapy as needed and directed.  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  Continue to observe and let me know if there is any changes.  Follow up in about 6 months (around 5/21/2025).     Arie Villalpando MD

## 2024-11-21 NOTE — PROGRESS NOTES
AUDIOLOGY ADULT AUDIOMETRIC EVALUATION    Name:  Linda Garibay  :  1950  Age:  74 y.o.  Date of Evaluation:  2024    Reason for visit: Linda is seen in the clinic today at the request of otolaryngology for an audiologic evaluation.     HISTORY  Patient reports a gradual decrease in hearing bilaterally.   She reports difficulty understanding others if not looking at them; especially in a group or with background noise.   She will also have difficulty with T.V. and clearly understanding.  She is enrolled in physical therapy to help with balance; she reports a spinal injury.      EVALUATION  See scanned audiogram: “Media” > “Audiology Report”.      RESULTS  Otoscopic Evaluation:  Right Ear: clear ear canal  Left Ear: clear ear canal    Immittance Measures:  Tympanometry:  Right Ear: Type A, normal tympanic membrane mobility with normal middle ear pressure  Left Ear: Type A, normal tympanic membrane mobility with normal middle ear pressure    Acoustic Reflexes:  Ipsilateral Right Ear: Acoustic reflexes present within normal limits 500Hz through 4KHz   Ipsilateral Left Ear: Acoustic reflexes present within normal limits 500Hz through 4KHz   Contralateral Right Ear: did not evaluate  Contralateral Left Ear: did not evaluate    Distortion Product Otoacoustic Emissions (DPOAEs):  Right Ear: Refer at most frequencies 1KHz-8KHz; passed at 1500Hz-2KHz   Left Ear: Refer at 1KHz; 4KHz-8KHz; passed 1500Hz-3KHz     Audiometry:  Test Technique and Reliability:   Standard audiometry via supra-aural headphones. Reliability is good.    Pure tone air and bone conduction audiometry:  Mild precipitously sloping to severe sensorineural hearing loss at 2KHz and above bilaterally    Speech Audiometry (Word Recognition Scores):   Right Ear:  Excellent at most comfortable listening level of loudness   Left Ear: Excellent at most comfortable listening level of loudness     IMPRESSIONS    Mild sloping to severe  sensorineural hearing loss at 2KHz and above bilaterally    RECOMMENDATIONS  - Follow up with otolaryngology today as scheduled.  *have cerumen removed   - Annual audiologic evaluation, sooner if an acute change is noted.  - Hearing aid evaluation.   Contact insurance to determine if there is an applicable benefit and where it can be used. Contact our office to schedule an appointment should she wish to proceed with hearing aids through our clinic.    PATIENT EDUCATION  Discussed results, impressions and recommendations with the patient. Questions were addressed and the patient was encouraged to contact our office should concerns arise.    Time for this encounter: 1000/1047    Cris Gusmna M.A., CCC/A   Licensed Audiologist

## 2024-12-18 ENCOUNTER — APPOINTMENT (OUTPATIENT)
Dept: AUDIOLOGY | Facility: CLINIC | Age: 74
End: 2024-12-18

## 2024-12-18 DIAGNOSIS — H90.3 SENSORINEURAL HEARING LOSS (SNHL) OF BOTH EARS: Primary | ICD-10-CM

## 2024-12-18 PROCEDURE — HRANC PR HEARING AID NO CHARGE: Performed by: AUDIOLOGIST

## 2024-12-19 NOTE — PROGRESS NOTES
"Hearing Aid Evaluation New hearing aid user    History: Patient reports increased difficulty understanding others in a group/with background noise.      Reviewed audiogram.     Programmed demo hearing aid for patient to listen with during today's appointment.     Patient reported increased \"sharpness\"; clarity.       Discussed hearing aid styles and technology options with patient.    *We are able to offer a current promotion through [employer] where the patient can purchase Premium, level 4, hearing aids at out level 1 pricing.        The patient would like to order two [employer] Nexia 9 mini KAY hearing aids in the color GOLD with #1 LP receivers bilaterally.     *Ordering standard  with order    Patient's smart phone that will be paired:  iPhone 16    Scheduled the hearing aid fitting and follow-up appointments.       *find and give patient smaller E.A.R. plugs to help with patient's 's snoring.       Cris Gusman M.A., CCC/A     "

## 2024-12-22 DIAGNOSIS — I10 PRIMARY HYPERTENSION: ICD-10-CM

## 2024-12-23 RX ORDER — LOSARTAN POTASSIUM 50 MG/1
50 TABLET ORAL DAILY
Qty: 90 TABLET | Refills: 1 | Status: SHIPPED | OUTPATIENT
Start: 2024-12-23

## 2025-01-14 ENCOUNTER — APPOINTMENT (OUTPATIENT)
Dept: AUDIOLOGY | Facility: CLINIC | Age: 75
End: 2025-01-14

## 2025-01-14 DIAGNOSIS — H90.3 SENSORINEURAL HEARING LOSS (SNHL) OF BOTH EARS: Primary | ICD-10-CM

## 2025-01-14 PROCEDURE — HRANC PR HEARING AID NO CHARGE: Performed by: AUDIOLOGIST

## 2025-01-16 NOTE — PROGRESS NOTES
"HEARING AID FITTING: New  Hearing aid user    RIGHT: CYNTHIA Resound Nexia 9   Serial# [id number]  Color: GOLD  Dome: M open :   #1LP          LEFT : CYNTHIA Resound Nexia 9   Serial# [id number]   Color: GOLD  Dome: M open :   #1LP         Warranty Expiration for hearing aids: 1/24/2028     \"STANDARD\": Serial #:  [id number]  Warranty Expiration for : 1/24/2028   *Loss and damage 1/24/2026    Fit patient with the above listed hearing aids set to 80% of target gain.  \"Own voice sounds loud\"; decreased loud speech from 2 to 0.   Discussed use and care of hearing aids with patient.   Practiced insertion and removal of hearing aids and adjusting volume.   Patient's hearing aids were paired to her phone in addition to Resound DORETHA.   Reviewed use of DORETHA.  Practiced utilizing phone with hearing aids for phone calls and streaming music.       Hearing aid follow-up is scheduled for one week to review information.   Patient will be instructed on replacing domes and wax guards.       Patient to sooner with any questions or concerns with hearing aids.       Cris Gusman M.A., CCC/A   "

## 2025-01-21 ENCOUNTER — APPOINTMENT (OUTPATIENT)
Dept: AUDIOLOGY | Facility: CLINIC | Age: 75
End: 2025-01-21
Payer: MEDICARE

## 2025-02-04 ENCOUNTER — APPOINTMENT (OUTPATIENT)
Dept: AUDIOLOGY | Facility: CLINIC | Age: 75
End: 2025-02-04
Payer: MEDICARE

## 2025-02-04 DIAGNOSIS — H90.3 SENSORINEURAL HEARING LOSS (SNHL) OF BOTH EARS: Primary | ICD-10-CM

## 2025-02-04 PROCEDURE — HRANC PR HEARING AID NO CHARGE: Performed by: AUDIOLOGIST

## 2025-02-12 NOTE — PROGRESS NOTES
"HEARING AID CHECK:    RIGHT: GN Resound Nexia 9   Serial# [id number]  Color: GOLD  Dome: M open :   #1LP          LEFT : GN Resound Nexia 9   Serial# [id number]   Color: GOLD  Dome: M open :   #1LP          Warranty Expiration for hearing aids: 1/24/2028      \"STANDARD\": Serial #:  [id number]  Warranty Expiration for : 1/24/2028   *Loss and damage 1/24/2026      Patient reports she is doing well with hearing aids.   She reports when she listens to music it sounds \"tinny\".   Created a \"music\" program with decreased high frequencies.      Instructed and patient practice replacement of domes and wax guards.  Patient did well.      See 2 weeks for hearing aid check.      Cris Gusman M.A., CCC/A   "

## 2025-02-27 ENCOUNTER — APPOINTMENT (OUTPATIENT)
Dept: AUDIOLOGY | Facility: CLINIC | Age: 75
End: 2025-02-27
Payer: MEDICARE

## 2025-02-27 DIAGNOSIS — H90.3 SENSORINEURAL HEARING LOSS (SNHL) OF BOTH EARS: Primary | ICD-10-CM

## 2025-02-27 PROCEDURE — HRANC PR HEARING AID NO CHARGE: Performed by: AUDIOLOGIST

## 2025-02-27 NOTE — PROGRESS NOTES
"HEARING AID CHECK:    RIGHT: CYNTHIA Resound Nexia 9   Serial# [id number]  Color: GOLD  Dome: M open :   #1LP          LEFT : CYNTHIA Resound Nexia 9   Serial# [id number]   Color: GOLD  Dome: M open :   #1LP          Warranty Expiration for hearing aids: 1/24/2028      \"STANDARD\": Serial #:  [id number]  Warranty Expiration for : 1/24/2028   *Loss and damage 1/24/2026    Patient reports she is doing well with hearing aids.       Domes have some wax; reviewed changing domes.   Recommended utilizing brush a few times per week.      Performed VERAFIT; Slight decrease at 3000 Hz.  See printout    See as needed; in the fall for audiogram/hearing aid check.      Cris Gusman M.A., CCC/A   "

## 2025-05-09 DIAGNOSIS — I10 PRIMARY HYPERTENSION: ICD-10-CM

## 2025-05-09 RX ORDER — LOSARTAN POTASSIUM 50 MG/1
50 TABLET ORAL DAILY
Qty: 90 TABLET | Refills: 0 | Status: SHIPPED | OUTPATIENT
Start: 2025-05-09

## 2025-05-15 ENCOUNTER — APPOINTMENT (OUTPATIENT)
Dept: OTOLARYNGOLOGY | Facility: CLINIC | Age: 75
End: 2025-05-15
Payer: MEDICARE

## 2025-05-15 VITALS — HEIGHT: 64 IN | WEIGHT: 198 LBS | BODY MASS INDEX: 33.8 KG/M2 | TEMPERATURE: 97.6 F

## 2025-05-15 DIAGNOSIS — H93.13 BILATERAL TINNITUS: ICD-10-CM

## 2025-05-15 DIAGNOSIS — J34.2 DEVIATED NASAL SEPTUM: ICD-10-CM

## 2025-05-15 DIAGNOSIS — H61.23 BILATERAL IMPACTED CERUMEN: Primary | ICD-10-CM

## 2025-05-15 DIAGNOSIS — H90.3 BILATERAL SENSORINEURAL HEARING LOSS: ICD-10-CM

## 2025-05-15 NOTE — PROGRESS NOTES
Chief Complaint   Patient presents with    Follow-up     LOV 11/24 EAR CLEANING, IRRIGATED     HPI:  Linda Garibay is a 74 y.o. female who complains of some chronic problems with bilateral hearing loss and wax impactions.  Known history of bilateral sloping high-frequency sensorineural hearing loss last checked in 2019.    We did repeat hearing test 11/2024 which continues to show bilateral sloping moderate to severe sensorineural hearing loss which is symmetric.    Has some wax impactions and itchy ears as well.  She also has some bilateral tinnitus which is longstanding.  She is also been having some hoarseness especially with singing over the past 2 to 3 years.  Some globus.  No sore throat, dysphagia, shortness of breath, coughing or throat clearing.  She may have some reflux.  Last visit she had normal scoping.  She has been doing speech therapy with good success.  Has hearing aids now    PMH:  Past Medical History:   Diagnosis Date    Atypical ductal hyperplasia, breast     HTN (hypertension)      Past Surgical History:   Procedure Laterality Date    BI MAMMO GUIDED BREAST LEFT LOCALIZATION Left 10/10/2013    BI MAMMO GUIDED LOCALIZATION BREAST LEFT [facility] CLINICAL LEGACY    BREAST BIOPSY      BREAST LUMPECTOMY      TOTAL KNEE ARTHROPLASTY Right 01/06/2020         Medications:     Current Outpatient Medications:     losartan (Cozaar) 50 mg tablet, Take 1 tablet by mouth once daily, Disp: 90 tablet, Rfl: 0    rosuvastatin (Crestor) 5 mg tablet, Take 1 tablet (5 mg) by mouth once daily., Disp: 90 tablet, Rfl: 1    pregabalin (Lyrica) 75 mg capsule, TAKE ONE CAPSULE BY MOUTH TWO TIMES A DAY (Patient not taking: Reported on 11/21/2024), Disp: 180 capsule, Rfl: 0     Allergies:  Allergies   Allergen Reactions    Ibuprofen Unknown    Nsaids (Non-Steroidal Anti-Inflammatory Drug) GI Upset        ROS:  Review of systems normal unless stated otherwise in the HPI and/or PMH.    Physical Exam:  Temperature 36.4 °C (97.6  "°F), height 1.626 m (5' 4\"), weight 89.8 kg (198 lb). Body mass index is 33.99 kg/m².     GENERAL APPEARANCE: Well developed and well nourished.  Alert and oriented in no acute distress.  Normal vocal quality.      HEAD/FACE: No erythema or edema or facial tenderness.  Normal facial nerve function bilaterally.    EAR:       EXTERNAL: Normal pinnas and bilateral obstructive cerumen impaction was removed by myself with instrumentation using the operating microscope.  Normal pinnas and external auditory canals after cleaning.  Both ears had obstructing wax removed with microscope and suction post failure flushing       MIDDLE EAR: Tympanic membranes intact and mobile with normal landmarks.  Middle ear space appears well aerated.       TUBE STATUS: N/A       MASTOID CAVITY: N/A       HEARING: Gross hearing assessment is within normal limits.      NOSE:       VISUALIZED USING: Anterior rhinoscopy with headlight and nasal speculum.  Flexible scoping       DORSUM: Midline, nontraumatic appearance.       MUCOSA: Normal-appearing.       SECRETIONS: Normal.       SEPTUM: Right deviation       INFERIOR TURBINATES: Normal.       MIDDLE TURBINATES/MEATUS: Normal       BLEEDING: N/A         ORAL CAVITY/PHARYNX:       TEETH: Adequate dentition.       TONGUE: No mass or lesion.  Normal mobility.       FLOOR OF MOUTH: No mass or lesion.       PALATE: Normal hard palate, soft palate, and uvula.       OROPHARYNX: Normal without mass or lesion.       BUCCAL MUCOSA/GBS: Normal without mass or lesion.       LIPS: Normal.    LARYNX/HYPOPHARYNX/NASOPHARYNX: N/A    NECK: No palpable masses or abnormal adenopathy.  Trachea is midline.    THYROID: No thyromegaly or palpable nodule.    SALIVARY GLANDS: Normal bilateral parotid and submandibular glands by inspection and palpation.    TMJ's: Normal.    NEURO: Cranial nerve exam grossly normal bilaterally.       Assessment/Plan   Linda was seen today for follow-up.  Diagnoses and all orders for " this visit:  Bilateral impacted cerumen (Primary)  Bilateral sensorineural hearing loss  Bilateral tinnitus  Deviated nasal septum    Doing well.  Ears were cleaned of wax which should help her significantly.  Continue hearing aids.  Check audiogram again in 6 months and repeat wax cleaning then.  No other acute changes at this time  Follow up in about 6 months (around 11/15/2025) for audiogram, sooner with changes or worsening symptoms.     Arie Villalpando MD

## 2025-06-16 ENCOUNTER — APPOINTMENT (OUTPATIENT)
Facility: CLINIC | Age: 75
End: 2025-06-16
Payer: MEDICARE

## 2025-06-16 VITALS
DIASTOLIC BLOOD PRESSURE: 80 MMHG | SYSTOLIC BLOOD PRESSURE: 116 MMHG | BODY MASS INDEX: 33.12 KG/M2 | WEIGHT: 194 LBS | OXYGEN SATURATION: 98 % | HEIGHT: 64 IN | HEART RATE: 72 BPM

## 2025-06-16 DIAGNOSIS — Z13.228 SCREENING FOR METABOLIC DISORDER: ICD-10-CM

## 2025-06-16 DIAGNOSIS — M54.42 CHRONIC BILATERAL LOW BACK PAIN WITH BILATERAL SCIATICA: ICD-10-CM

## 2025-06-16 DIAGNOSIS — Z78.0 POSTMENOPAUSAL: ICD-10-CM

## 2025-06-16 DIAGNOSIS — Z13.1 SCREENING FOR DIABETES MELLITUS: ICD-10-CM

## 2025-06-16 DIAGNOSIS — G89.29 CHRONIC BILATERAL LOW BACK PAIN WITH BILATERAL SCIATICA: ICD-10-CM

## 2025-06-16 DIAGNOSIS — G89.29 ACUTE ON CHRONIC LOW BACK PAIN: Primary | ICD-10-CM

## 2025-06-16 DIAGNOSIS — Z13.220 SCREENING FOR LIPID DISORDERS: ICD-10-CM

## 2025-06-16 DIAGNOSIS — Z13.29 SCREENING FOR THYROID DISORDER: ICD-10-CM

## 2025-06-16 DIAGNOSIS — M54.41 CHRONIC BILATERAL LOW BACK PAIN WITH BILATERAL SCIATICA: ICD-10-CM

## 2025-06-16 DIAGNOSIS — M54.50 ACUTE ON CHRONIC LOW BACK PAIN: Primary | ICD-10-CM

## 2025-06-16 DIAGNOSIS — E55.9 VITAMIN D DEFICIENCY: ICD-10-CM

## 2025-06-16 DIAGNOSIS — Z13.0 SCREENING FOR DISORDER OF BLOOD AND BLOOD-FORMING ORGANS: ICD-10-CM

## 2025-06-16 PROCEDURE — 1036F TOBACCO NON-USER: CPT

## 2025-06-16 PROCEDURE — 1159F MED LIST DOCD IN RCRD: CPT

## 2025-06-16 PROCEDURE — 99214 OFFICE O/P EST MOD 30 MIN: CPT

## 2025-06-16 PROCEDURE — 1157F ADVNC CARE PLAN IN RCRD: CPT

## 2025-06-16 PROCEDURE — 3074F SYST BP LT 130 MM HG: CPT

## 2025-06-16 PROCEDURE — 3079F DIAST BP 80-89 MM HG: CPT

## 2025-06-16 RX ORDER — OMEPRAZOLE 40 MG/1
40 CAPSULE, DELAYED RELEASE ORAL
Qty: 30 CAPSULE | Refills: 2 | Status: SHIPPED | OUTPATIENT
Start: 2025-06-16 | End: 2025-09-14

## 2025-06-16 RX ORDER — METHYLPREDNISOLONE 4 MG/1
TABLET ORAL
Qty: 21 TABLET | Refills: 0 | Status: SHIPPED | OUTPATIENT
Start: 2025-06-16 | End: 2025-06-22

## 2025-06-16 ASSESSMENT — ENCOUNTER SYMPTOMS
LEG PAIN: 1
BACK PAIN: 1
WEAKNESS: 1

## 2025-06-16 ASSESSMENT — PATIENT HEALTH QUESTIONNAIRE - PHQ9
2. FEELING DOWN, DEPRESSED OR HOPELESS: NOT AT ALL
SUM OF ALL RESPONSES TO PHQ9 QUESTIONS 1 AND 2: 0
1. LITTLE INTEREST OR PLEASURE IN DOING THINGS: NOT AT ALL

## 2025-06-16 NOTE — PROGRESS NOTES
I reviewed and examined the patient. I was present for the key exam elements, and I fully participated in the patient's care. I discussed the management of the care with the resident. I have personally reviewed the pertinent labs and imaging, as well as recent notes, with the patient. I have reviewed the note above and agree with the resident's medical decision making as documented in the resident's note, in addition to the following comments / findings:     Agree with the rest of the plan outlined below by resident physician. No red flags.      The patient understands and agrees to the assessment and plan of care. Patient has also agreed to follow up and comply with the treatment and evaluation as recommended today. Patient was instructed to call the office at [phone] should questions arise regarding their treatment or care.     Timoteo Erickson DO, FAOASM  Family Medicine   [address], [address]  [place] [address]     Timoteo Erickson DO

## 2025-06-16 NOTE — PROGRESS NOTES
"Linda Garibay is a 75 y.o. year old female presenting for Back Pain (Back pain x 5 years )     Answers submitted by the patient for this visit:  Back Pain Questionnaire (Submitted on 6/16/2025)  Chief Complaint: Back pain  Chronicity: recurrent  Onset: more than 1 year ago  Frequency: daily  Progression since onset: waxing and waning  Pain location: gluteal, sacro-iliac  Pain quality: aching  Radiates to: left foot, left knee, left thigh, right foot, right knee, right thigh  Pain is: worse during the day  Aggravated by: bending, coughing, position, standing, twisting  Stiffness is present: all day  leg pain: Yes  weakness: Yes  Risk factors: menopause, obesity    HPI:  Long hx of back pain, hx of grade one anterolisthesis on MRI. Has followed with Dr. Mckeon, has had MRIs, last done about 5 years ago.   Gets about 3/4 of the way through grocery trips and then feels like her legs are fatigued, uses the grocery cart for support. Also has a bit of a balance issue, currently doing PT for balance.   Recumbent bike and elliptical daily.   Still does PT exercises for knee pain post knee surgery.  Back pain got bad this week and she started re-taking naproxen.   Also asked about osteopenia noted on a shoulder xray. Asked about varicose veins.     ROS:   All pertinent positive symptoms are included in history of present illness.    All other systems have been reviewed and are negative and noncontributory to this patient's current ailments.    Current Medications[1]    OBJECTIVE  Visit Vitals  /80   Pulse 72   Ht 1.626 m (5' 4\")   Wt 88 kg (194 lb)   SpO2 98%   BMI 33.30 kg/m²   OB Status Postmenopausal   Smoking Status Never   BSA 1.99 m²      Physical Exam:  CONSTITUTIONAL - well nourished, well developed, looks like stated age, in no acute distress, not ill-appearing, and not tired appearing  SKIN - normal skin color and pigmentation, normal skin turgor without rash, lesions, or nodules visualized  HEAD - no " trauma, normocephalic  EYES - pupils are equal and reactive to light, extraocular muscles are intact, and normal external exam  NECK - supple without rigidity, no neck mass was observed, no thyromegaly or thyroid nodules  EXTREMITIES - no edema, no deformities  NEUROLOGICAL - normal gait, normal balance, normal motor, no ataxia, alert, oriented and no focal signs. Straight leg test negative b/l, Spurling test negative b/l. Some generalized weakness.   PSYCHIATRIC - alert, pleasant and cordial, age-appropriate  IMMUNOLOGIC - no cervical lymphadenopathy     Assessment/Plan   Problem List Items Addressed This Visit           ICD-10-CM    Chronic bilateral low back pain with bilateral sciatica M54.42, M54.41, G89.29    Relevant Medications    methylPREDNISolone (Medrol Dospak) 4 mg tablets    omeprazole (PriLOSEC) 40 mg DR capsule    Acute on chronic low back pain - Primary M54.50, G89.29    Sent medrol dosepak to pharmacy if needed  Take with omeprazole 40 daily in the AM for GI ppx  Continue PT exercises  Try to incorporate more protein in your diet   Recommended some healthy vitamins   Bloodwork & DEXA ordered prior to physical   Return to office for OMT as needed   Last imaging done in 2017 showed L4/5 grade 1 anterolisthesis          Relevant Medications    methylPREDNISolone (Medrol Dospak) 4 mg tablets    omeprazole (PriLOSEC) 40 mg DR capsule    Postmenopausal Z78.0    Relevant Orders    XR DEXA bone density    Vitamin D 25-Hydroxy,Total (for eval of Vitamin D levels)    Vitamin D deficiency E55.9    Relevant Orders    Vitamin D 25-Hydroxy,Total (for eval of Vitamin D levels)    Screening for disorder of blood and blood-forming organs Z13.0    Relevant Orders    CBC    Screening for metabolic disorder Z13.228    Relevant Orders    Comprehensive Metabolic Panel    Screening for diabetes mellitus Z13.1    Relevant Orders    Hemoglobin A1C    Body mass index (BMI) 33.0-33.9, adult Z68.33    Relevant Orders    CBC     Screening for lipid disorders Z13.220    Relevant Orders    Lipid Panel    Screening for thyroid disorder Z13.29    Relevant Orders    TSH with reflex to Free T4 if abnormal     Return to clinic in 1 week as scheduled for your AWV.     I have personally reviewed all available pertinent labs, imaging, and consult notes with the patient.      All questions and concerns were addressed. Patient verbalizes understanding instructions and agrees with established plan of care.      Patient seen and discussed with Dr. Dre Paul DO, MA   PGY-1 [facility]         [1]   Current Outpatient Medications   Medication Sig Dispense Refill    losartan (Cozaar) 50 mg tablet Take 1 tablet by mouth once daily 90 tablet 0    rosuvastatin (Crestor) 5 mg tablet Take 1 tablet (5 mg) by mouth once daily. 90 tablet 1    methylPREDNISolone (Medrol Dospak) 4 mg tablets Take as directed on package. 21 tablet 0    omeprazole (PriLOSEC) 40 mg DR capsule Take 1 capsule (40 mg) by mouth once daily in the morning. Take before meals. Do not crush or chew. 30 capsule 2    pregabalin (Lyrica) 75 mg capsule TAKE ONE CAPSULE BY MOUTH TWO TIMES A DAY (Patient not taking: Reported on 11/21/2024) 180 capsule 0     No current facility-administered medications for this visit.

## 2025-06-16 NOTE — PATIENT INSTRUCTIONS
Ways to boost protein:    -greek yogurt   -Cottage cheese    -eggs/egg whites   -whey protein/protein powders   -increase lean meat protein sources  Try to get at least 20-30g of protein 3-4x per day.   Try to get 115g of protein per day.      OTC Supplements we discussed:   Multivitamin of your choice. Preferably without calcium due to your history of coronary calcium.   Vitamin D3 5,000 international units daily    Vitamin K2 150-300 mcg daily  High quality omega-3 supplement 1,000 mg daily   Magnesium glycinate 400 mg

## 2025-06-16 NOTE — ASSESSMENT & PLAN NOTE
Sent medrol dosepak to pharmacy if needed  Take with omeprazole 40 daily in the AM for GI ppx  Continue PT exercises  Try to incorporate more protein in your diet   Recommended some healthy vitamins   Bloodwork & DEXA ordered prior to physical   Return to office for OMT as needed   Last imaging done in 2017 showed L4/5 grade 1 anterolisthesis

## 2025-06-17 LAB
25(OH)D3+25(OH)D2 SERPL-MCNC: 43 NG/ML (ref 30–100)
ALBUMIN SERPL-MCNC: 4.3 G/DL (ref 3.6–5.1)
ALP SERPL-CCNC: 82 U/L (ref 37–153)
ALT SERPL-CCNC: 16 U/L (ref 6–29)
ANION GAP SERPL CALCULATED.4IONS-SCNC: 7 MMOL/L (CALC) (ref 7–17)
AST SERPL-CCNC: 18 U/L (ref 10–35)
BILIRUB SERPL-MCNC: 0.5 MG/DL (ref 0.2–1.2)
BUN SERPL-MCNC: 14 MG/DL (ref 7–25)
CALCIUM SERPL-MCNC: 9 MG/DL (ref 8.6–10.4)
CHLORIDE SERPL-SCNC: 105 MMOL/L (ref 98–110)
CHOLEST SERPL-MCNC: 168 MG/DL
CHOLEST/HDLC SERPL: 3.2 (CALC)
CO2 SERPL-SCNC: 28 MMOL/L (ref 20–32)
CREAT SERPL-MCNC: 0.79 MG/DL (ref 0.6–1)
EGFRCR SERPLBLD CKD-EPI 2021: 78 ML/MIN/1.73M2
ERYTHROCYTE [DISTWIDTH] IN BLOOD BY AUTOMATED COUNT: 12.4 % (ref 11–15)
EST. AVERAGE GLUCOSE BLD GHB EST-MCNC: 131 MG/DL
EST. AVERAGE GLUCOSE BLD GHB EST-SCNC: 7.3 MMOL/L
GLUCOSE SERPL-MCNC: 89 MG/DL (ref 65–99)
HBA1C MFR BLD: 6.2 %
HCT VFR BLD AUTO: 42.1 % (ref 35–45)
HDLC SERPL-MCNC: 53 MG/DL
HGB BLD-MCNC: 13.7 G/DL (ref 11.7–15.5)
LDLC SERPL CALC-MCNC: 94 MG/DL (CALC)
MCH RBC QN AUTO: 29.7 PG (ref 27–33)
MCHC RBC AUTO-ENTMCNC: 32.5 G/DL (ref 32–36)
MCV RBC AUTO: 91.1 FL (ref 80–100)
NONHDLC SERPL-MCNC: 115 MG/DL (CALC)
PLATELET # BLD AUTO: 242 THOUSAND/UL (ref 140–400)
PMV BLD REES-ECKER: 9.8 FL (ref 7.5–12.5)
POTASSIUM SERPL-SCNC: 4.7 MMOL/L (ref 3.5–5.3)
PROT SERPL-MCNC: 6.9 G/DL (ref 6.1–8.1)
RBC # BLD AUTO: 4.62 MILLION/UL (ref 3.8–5.1)
SODIUM SERPL-SCNC: 140 MMOL/L (ref 135–146)
TRIGL SERPL-MCNC: 115 MG/DL
TSH SERPL-ACNC: 2.1 MIU/L (ref 0.4–4.5)
WBC # BLD AUTO: 4.4 THOUSAND/UL (ref 3.8–10.8)

## 2025-06-19 ENCOUNTER — HOSPITAL ENCOUNTER (OUTPATIENT)
Dept: RADIOLOGY | Facility: HOSPITAL | Age: 75
Discharge: HOME | End: 2025-06-19
Payer: MEDICARE

## 2025-06-19 DIAGNOSIS — Z78.0 POSTMENOPAUSAL: ICD-10-CM

## 2025-06-19 PROCEDURE — 77080 DXA BONE DENSITY AXIAL: CPT

## 2025-06-19 PROCEDURE — 77080 DXA BONE DENSITY AXIAL: CPT | Performed by: RADIOLOGY

## 2025-06-23 ENCOUNTER — APPOINTMENT (OUTPATIENT)
Dept: PRIMARY CARE | Facility: CLINIC | Age: 75
End: 2025-06-23
Payer: MEDICARE

## 2025-06-23 VITALS
WEIGHT: 192 LBS | OXYGEN SATURATION: 97 % | HEART RATE: 60 BPM | SYSTOLIC BLOOD PRESSURE: 114 MMHG | HEIGHT: 64 IN | BODY MASS INDEX: 32.78 KG/M2 | DIASTOLIC BLOOD PRESSURE: 80 MMHG

## 2025-06-23 DIAGNOSIS — Z00.00 ROUTINE GENERAL MEDICAL EXAMINATION AT HEALTH CARE FACILITY: Primary | ICD-10-CM

## 2025-06-23 DIAGNOSIS — I10 PRIMARY HYPERTENSION: ICD-10-CM

## 2025-06-23 DIAGNOSIS — Z12.11 COLON CANCER SCREENING: ICD-10-CM

## 2025-06-23 DIAGNOSIS — R73.03 PREDIABETES: ICD-10-CM

## 2025-06-23 DIAGNOSIS — M85.89 OSTEOPENIA OF MULTIPLE SITES: ICD-10-CM

## 2025-06-23 DIAGNOSIS — E78.5 HYPERLIPIDEMIA, UNSPECIFIED HYPERLIPIDEMIA TYPE: ICD-10-CM

## 2025-06-23 PROCEDURE — 3074F SYST BP LT 130 MM HG: CPT

## 2025-06-23 PROCEDURE — 1159F MED LIST DOCD IN RCRD: CPT

## 2025-06-23 PROCEDURE — G0439 PPPS, SUBSEQ VISIT: HCPCS

## 2025-06-23 PROCEDURE — 1036F TOBACCO NON-USER: CPT

## 2025-06-23 PROCEDURE — 3079F DIAST BP 80-89 MM HG: CPT

## 2025-06-23 RX ORDER — ROSUVASTATIN CALCIUM 5 MG/1
5 TABLET, COATED ORAL DAILY
Qty: 90 TABLET | Refills: 1 | Status: SHIPPED | OUTPATIENT
Start: 2025-06-23 | End: 2025-12-20

## 2025-06-23 RX ORDER — LOSARTAN POTASSIUM 50 MG/1
50 TABLET ORAL DAILY
Qty: 90 TABLET | Refills: 1 | Status: SHIPPED | OUTPATIENT
Start: 2025-06-23

## 2025-06-23 RX ORDER — CHOLECALCIFEROL (VITAMIN D3) 25 MCG
25 TABLET ORAL DAILY
COMMUNITY

## 2025-06-23 RX ORDER — OMEGA-3-ACID ETHYL ESTERS 1 G/1
1 CAPSULE, LIQUID FILLED ORAL 2 TIMES DAILY
COMMUNITY

## 2025-06-23 RX ORDER — PHYTONADIONE 5 MG/1
5 TABLET ORAL ONCE
COMMUNITY

## 2025-06-23 NOTE — PROGRESS NOTES
"Subjective   Patient ID: Linda Garibay is a 75 y.o. female who presents for Annual Exam.  Today she is accompanied by alone.     HPI  Overall patient is doing well.   Immunization: Tdap: 2024  Shingrix: completed  PCV: completed  RSV: will get in fall  COVID-19 vaccine: UTD  Colon Cancer Screening: no family history. Last completed decades ago. Will get colonoscopy.  Last mammogram 4/2024 and normal. No longer wants to do.  Last osteoporosis Dexa Scan: 6/2024 with osteopenia  Hep C one time screening: never done  Tobacco: Denies use, never smoker  EtOH: Rarely, Socially   Other drugs: denies use    Chronic Conditions:  Prediabetes  Doing diet and exercise changes  Osteopenia  Takes vit d and eats plenty of calcium  HTN  Losartan 50mg  Stable without HA, sob, cp  HLD  Crestor 5mg  Stable LDL 94 on 6/2025    Medications Ordered Prior to Encounter[1]     Allergies[2]    Immunization History   Administered Date(s) Administered    Flu vaccine, trivalent, preservative free, age 6 months and greater (Fluarix/Fluzone/Flulaval) 10/16/2015    Influenza, injectable, quadrivalent 10/06/2017, 10/23/2018    Influenza, seasonal, injectable 08/06/2015, 08/16/2017    Yousuf SARS-CoV-2 Vaccination 03/12/2021    Pfizer COVID-19 vaccine, 12 years and older, (30mcg/0.3mL) (Comirnaty) 10/04/2023    Pfizer COVID-19 vaccine, bivalent, age 12 years and older (30 mcg/0.3 mL) 10/14/2022    Pfizer Purple Cap SARS-CoV-2 11/30/2021, 04/18/2022    Tdap vaccine, age 7 year and older (BOOSTRIX, ADACEL) 04/24/2024         Review of Systems  All pertinent positive symptoms are included in the history of present illness.  All other systems have been reviewed and are negative and noncontributory to this patient's current ailments.     Objective   /80   Pulse 60   Ht 1.626 m (5' 4\")   Wt 87.1 kg (192 lb)   SpO2 97%   BMI 32.96 kg/m²   BSA: 1.98 meters squared  Office Visit on 06/16/2025   Component Date Value Ref Range Status    WHITE " BLOOD CELL COUNT 06/16/2025 4.4  3.8 - 10.8 Thousand/uL Final    RED BLOOD CELL COUNT 06/16/2025 4.62  3.80 - 5.10 Million/uL Final    HEMOGLOBIN 06/16/2025 13.7  11.7 - 15.5 g/dL Final    HEMATOCRIT 06/16/2025 42.1  35.0 - 45.0 % Final    MCV 06/16/2025 91.1  80.0 - 100.0 fL Final    MCH 06/16/2025 29.7  27.0 - 33.0 pg Final    MCHC 06/16/2025 32.5  32.0 - 36.0 g/dL Final    Comment: For adults, a slight decrease in the calculated MCHC  value (in the range of 30 to 32 g/dL) is most likely  not clinically significant; however, it should be  interpreted with caution in correlation with other  red cell parameters and the patient's clinical  condition.      RDW 06/16/2025 12.4  11.0 - 15.0 % Final    PLATELET COUNT 06/16/2025 242  140 - 400 Thousand/uL Final    MPV 06/16/2025 9.8  7.5 - 12.5 fL Final    GLUCOSE 06/16/2025 89  65 - 99 mg/dL Final    Comment:               Fasting reference interval         UREA NITROGEN (BUN) 06/16/2025 14  7 - 25 mg/dL Final    CREATININE 06/16/2025 0.79  0.60 - 1.00 mg/dL Final    EGFR 06/16/2025 78  > OR = 60 mL/min/1.73m2 Final    SODIUM 06/16/2025 140  135 - 146 mmol/L Final    POTASSIUM 06/16/2025 4.7  3.5 - 5.3 mmol/L Final    CHLORIDE 06/16/2025 105  98 - 110 mmol/L Final    CARBON DIOXIDE 06/16/2025 28  20 - 32 mmol/L Final    ELECTROLYTE BALANCE 06/16/2025 7  7 - 17 mmol/L (calc) Final    CALCIUM 06/16/2025 9.0  8.6 - 10.4 mg/dL Final    PROTEIN, TOTAL 06/16/2025 6.9  6.1 - 8.1 g/dL Final    ALBUMIN 06/16/2025 4.3  3.6 - 5.1 g/dL Final    BILIRUBIN, TOTAL 06/16/2025 0.5  0.2 - 1.2 mg/dL Final    ALKALINE PHOSPHATASE 06/16/2025 82  37 - 153 U/L Final    AST 06/16/2025 18  10 - 35 U/L Final    ALT 06/16/2025 16  6 - 29 U/L Final    HEMOGLOBIN A1c 06/16/2025 6.2 (H)  <5.7 % Final    Comment: For someone without known diabetes, a hemoglobin   A1c value between 5.7% and 6.4% is consistent with  prediabetes and should be confirmed with a   follow-up test.     For someone with known  diabetes, a value <7%  indicates that their diabetes is well controlled. A1c  targets should be individualized based on duration of  diabetes, age, comorbid conditions, and other  considerations.     This assay result is consistent with an increased risk  of diabetes.     Currently, no consensus exists regarding use of  hemoglobin A1c for diagnosis of diabetes for children.         eAG (mg/dL) 06/16/2025 131  mg/dL Final    eAG (mmol/L) 06/16/2025 7.3  mmol/L Final    CHOLESTEROL, TOTAL 06/16/2025 168  <200 mg/dL Final    HDL CHOLESTEROL 06/16/2025 53  > OR = 50 mg/dL Final    TRIGLYCERIDES 06/16/2025 115  <150 mg/dL Final    LDL-CHOLESTEROL 06/16/2025 94  mg/dL (calc) Final    Comment: Reference range: <100     Desirable range <100 mg/dL for primary prevention;    <70 mg/dL for patients with CHD or diabetic patients   with > or = 2 CHD risk factors.     LDL-C is now calculated using the Humble-Ciro   calculation, which is a validated novel method providing   better accuracy than the Friedewald equation in the   estimation of LDL-C.   Humble SS et al. JULIO. 2013;310(19): 3251-1403   (http://education.[employer]/faq/WYK722)      CHOL/HDLC RATIO 06/16/2025 3.2  <5.0 (calc) Final    NON HDL CHOLESTEROL 06/16/2025 115  <130 mg/dL (calc) Final    Comment: For patients with diabetes plus 1 major ASCVD risk   factor, treating to a non-HDL-C goal of <100 mg/dL   (LDL-C of <70 mg/dL) is considered a therapeutic   option.      TSH W/REFLEX TO FT4 06/16/2025 2.10  0.40 - 4.50 mIU/L Final    VITAMIN D,25-OH,TOTAL,IA 06/16/2025 43  30 - 100 ng/mL Final    Comment: Vitamin D Status         25-OH Vitamin D:     Deficiency:                    <20 ng/mL  Insufficiency:             20 - 29 ng/mL  Optimal:                 > or = 30 ng/mL     For 25-OH Vitamin D testing on patients on   D2-supplementation and patients for whom quantitation   of D2 and D3 fractions is required, the QuestAssureD(TM)  25-OH VIT D, (D2,D3),  LC/MS/MS is recommended: order   code 22332 (patients >2yrs).     See Note 1     Note 1     For additional information, please refer to   http://education.[employer]/faq/VXO480   (This link is being provided for informational/  educational purposes only.)         Physical Exam  CONSTITUTIONAL - well nourished, well developed, looks like stated age, in no acute distress, not ill-appearing, and not tired appearing  SKIN - normal skin color and pigmentation, normal skin turgor without rash, lesions, or nodules visualized  HEAD - no trauma, normocephalic  EYES - extraocular muscles are intact, and normal external exam  CHEST - clear to auscultation, no wheezing, no crackles and no rales, good effort  CARDIAC - regular rate and regular rhythm, no skipped beats, no murmur  EXTREMITIES - no edema, no deformities  NEUROLOGICAL - normal gait, normal balance, normal motor, no ataxia; alert, oriented and no focal signs  PSYCHIATRIC - alert, pleasant and cordial, age-appropriate      Assessment/Plan   Diagnoses and all orders for this visit:  Routine general medical examination at health care facility  -     1 Year Follow Up In Primary Care - Wellness Exam; Future  Primary hypertension  -     losartan (Cozaar) 50 mg tablet; Take 1 tablet (50 mg) by mouth once daily.  Hyperlipidemia, unspecified hyperlipidemia type  -     rosuvastatin (Crestor) 5 mg tablet; Take 1 tablet (5 mg) by mouth once daily.  Colon cancer screening  -     Cologuard® colon cancer screening; Future  Prediabetes  Osteopenia of multiple sites      Discussed dietary and exercise changes to help with blood sugars and bone health.     Discussed RSV for the upcoming fall.  Cologuard ordered for cancer screening.    Refilled current medications. No changes advised today.    Please complete all ordered lab work and screenings. Will reach out to you if the results warrant any change in management.     Discussed the plan of care with the patient and they  provided their understanding. All questions dicussed and answered during visit.       [1]   Current Outpatient Medications on File Prior to Visit   Medication Sig Dispense Refill    cholecalciferol (Vitamin D3) 25 mcg (1,000 units) tablet Take 1 tablet (25 mcg) by mouth once daily.      losartan (Cozaar) 50 mg tablet Take 1 tablet by mouth once daily 90 tablet 0    omega-3 acid ethyl esters (Lovaza) 1 gram capsule Take 1 capsule (1 g) by mouth 2 times a day.      omeprazole (PriLOSEC) 40 mg DR capsule Take 1 capsule (40 mg) by mouth once daily in the morning. Take before meals. Do not crush or chew. 30 capsule 2    phytonadione (Vitamin K) 5 mg tablet Take 1 tablet (5 mg) by mouth 1 time.      rosuvastatin (Crestor) 5 mg tablet Take 1 tablet (5 mg) by mouth once daily. 90 tablet 1    [] methylPREDNISolone (Medrol Dospak) 4 mg tablets Take as directed on package. (Patient not taking: Reported on 2025) 21 tablet 0     No current facility-administered medications on file prior to visit.   [2]   Allergies  Allergen Reactions    Ibuprofen Unknown    Nsaids (Non-Steroidal Anti-Inflammatory Drug) GI Upset

## 2025-07-03 ENCOUNTER — APPOINTMENT (OUTPATIENT)
Facility: CLINIC | Age: 75
End: 2025-07-03
Payer: MEDICARE

## 2025-07-03 VITALS
HEART RATE: 62 BPM | DIASTOLIC BLOOD PRESSURE: 80 MMHG | WEIGHT: 190 LBS | SYSTOLIC BLOOD PRESSURE: 126 MMHG | BODY MASS INDEX: 32.44 KG/M2 | HEIGHT: 64 IN | OXYGEN SATURATION: 99 %

## 2025-07-03 DIAGNOSIS — M99.00 SOMATIC DYSFUNCTION OF HEAD REGION: ICD-10-CM

## 2025-07-03 DIAGNOSIS — M99.01 SOMATIC DYSFUNCTION OF CERVICAL REGION: ICD-10-CM

## 2025-07-03 DIAGNOSIS — M99.09 SOMATIC DYSFUNCTON OF OTHER REGION: ICD-10-CM

## 2025-07-03 DIAGNOSIS — M54.42 CHRONIC BILATERAL LOW BACK PAIN WITH BILATERAL SCIATICA: Primary | ICD-10-CM

## 2025-07-03 DIAGNOSIS — M99.03 SOMATIC DYSFUNCTION OF LUMBAR REGION: ICD-10-CM

## 2025-07-03 DIAGNOSIS — M99.08 SOMATIC DYSFUNCTION OF RIB CAGE REGION: ICD-10-CM

## 2025-07-03 DIAGNOSIS — M99.06 SOMATIC DYSFUNCTION OF LOWER EXTREMITY: ICD-10-CM

## 2025-07-03 DIAGNOSIS — M99.05 SOMATIC DYSFUNCTION OF PELVIS REGION: ICD-10-CM

## 2025-07-03 DIAGNOSIS — G89.29 CHRONIC BILATERAL LOW BACK PAIN WITH BILATERAL SCIATICA: Primary | ICD-10-CM

## 2025-07-03 DIAGNOSIS — M99.09 SOMATIC DYSFUNCTION OF ABDOMINAL REGION: ICD-10-CM

## 2025-07-03 DIAGNOSIS — M99.04 SOMATIC DYSFUNCTION OF SACRAL REGION: ICD-10-CM

## 2025-07-03 DIAGNOSIS — M99.02 SOMATIC DYSFUNCTION OF THORACIC REGION: ICD-10-CM

## 2025-07-03 DIAGNOSIS — M54.41 CHRONIC BILATERAL LOW BACK PAIN WITH BILATERAL SCIATICA: Primary | ICD-10-CM

## 2025-07-03 PROCEDURE — 1036F TOBACCO NON-USER: CPT

## 2025-07-03 PROCEDURE — 99213 OFFICE O/P EST LOW 20 MIN: CPT

## 2025-07-03 PROCEDURE — 1159F MED LIST DOCD IN RCRD: CPT

## 2025-07-03 PROCEDURE — 3074F SYST BP LT 130 MM HG: CPT

## 2025-07-03 PROCEDURE — 3079F DIAST BP 80-89 MM HG: CPT

## 2025-07-03 ASSESSMENT — PATIENT HEALTH QUESTIONNAIRE - PHQ9
SUM OF ALL RESPONSES TO PHQ9 QUESTIONS 1 AND 2: 0
2. FEELING DOWN, DEPRESSED OR HOPELESS: NOT AT ALL
1. LITTLE INTEREST OR PLEASURE IN DOING THINGS: NOT AT ALL

## 2025-07-03 NOTE — PROGRESS NOTES
I reviewed and examined the patient. I was present for the key exam elements, and I fully participated in the patient's care. I discussed the management of the care with the resident. I have personally reviewed the pertinent labs and imaging, as well as recent notes, with the patient. I have reviewed the note above and agree with the resident's medical decision making as documented in the resident's note, in addition to the following comments / findings:     Agree with the rest of the plan outlined below by resident physician. No red flags.      The patient understands and agrees to the assessment and plan of care. Patient has also agreed to follow up and comply with the treatment and evaluation as recommended today. Patient was instructed to call the office at [phone] should questions arise regarding their treatment or care.     Timoteo Erickson DO, FAOASM  Family Medicine   [address], [address]  [place] [address]     Timoteo Erickson DO

## 2025-07-03 NOTE — PROGRESS NOTES
General:  Healthy 74 y/o F. Good balance, posture, and strength at baseline function; patient in no acute distress.   No sign of pathological impingement, strains, fractures, instabilities, or metastatic process appreciated on examination.     Patient was examined in the following positions: standing, seated, supine, lateral recumbent    Neuro:   Muscle strength:   5/5 (R)     65/5 (L)  Sensory intact B/L  Cerebellar: Intact    Structural Exam:   Posture/Gait: posture with some forward bending at hips   Structural Curvature (Lordosis/ Kyphosis/ Scoliosis): ++ thoracic kyphosis   Leg Length: (longer) L   1st Rib: (elevation)   R   Straight leg test: negative b/L  Radiculopathy:  negative b/L  Yovanny: negative b/L  Spurling: negative b/L    Osteopathic Examination:   General fascial pull to sacrum  Diaphragms: all general restriction:   OA R, thoracic inlet R, respiratory diaphragm L,     Cranial:   OA/AA general restriction  Alar ligament restriction     Cervical:  C2 dysfunction  C3-5 dysfunction, likely underlying osteoarthritis   Cervical fascial restriction to sacrum     Thoracic inlet restriction   Midsternal tenderpoint  Diaphragm restricted b/l with rib flare  Xyphoid area/celiac ganglion restriction     Rib dysfunction:  Thoracic dysfunction: Kyphosis  T1 F rR   T12 restricted b/l QL hypertonicity   Lumbar dysfunction: anterolisthesis   Sacral dysfunction: L on L       LOWER  Leg length: L longer   Innominate dysfunction: Right anterior innominate   Acetabular joint healthy and scrub test negative   B/l foot arch dysfunction      Osteopathic Manipulative Treatments were used to address the above somatic dysfunctions. Techniques used included:   ST   MFR  ART   CR   CS  ME  VIS   FPR   DIR  IND LAS   BLT   Still      After treatment there was lessened or absent hypertonicity in above regions.   The patient was able to breathe much more deeply and general posture was improved.     Patient tolerated procedure  well and left with questions answered. Patient aware that it could be some mild soreness and discomfort for several days after Osteopathic maybe the treatment due to the neuromuscular changes that occur due to the OMT so they are using muscles more efficiently but differently which can create delayed onset muscle soreness similar to what occurs after any exercise that is new to the body.    Patient tolerated procedure well with no complications. Patient was educated on possible post-treatment reaction such as fatigue and myalgias and appropriate treatment for this.    Return in 4-6 weeks for follow up treatment   Instructions provided for glute and core strengthening   ~~~~~~~~~~~~~~~~~~~~~~~~~~~~~~~~~~~~~~~~~~~~~~~~~~~~~  I have personally reviewed all available pertinent labs, imaging, and consult notes with the patient.      All questions and concerns were addressed. Patient verbalizes understanding instructions and agrees with established plan of care.      Patient seen and discussed with Dr. Dre Paul DO, MA   PGY-2 [facility]

## 2025-07-03 NOTE — PATIENT INSTRUCTIONS
Stay active and walk/exercise as usual with no restrictions, and drink plenty of water post treatment. Please do not do more than your usual activity level even if you're feeling wonderful after treatment. Be aware of feeling sore post-treatment, but this is generally normal. It may take your body up to 2 weeks to regulate after osteopathic manipulative treatment in which new aches and pains may come up. Soreness, new movement patterning, and slight coordination issues are normal post treatment up to 2 weeks. Please schedule a follow up visit in 4-6 weeks if you have ongoing pain that is the same as before treatment or in a new area.

## 2025-07-04 LAB — NONINV COLON CA DNA+OCC BLD SCRN STL QL: NEGATIVE

## 2025-08-08 ENCOUNTER — APPOINTMENT (OUTPATIENT)
Facility: CLINIC | Age: 75
End: 2025-08-08
Payer: MEDICARE

## 2025-08-08 VITALS
WEIGHT: 195 LBS | DIASTOLIC BLOOD PRESSURE: 70 MMHG | HEIGHT: 64 IN | OXYGEN SATURATION: 99 % | HEART RATE: 66 BPM | BODY MASS INDEX: 33.29 KG/M2 | SYSTOLIC BLOOD PRESSURE: 116 MMHG

## 2025-08-08 DIAGNOSIS — M54.30 SCIATICA, UNSPECIFIED LATERALITY: ICD-10-CM

## 2025-08-08 DIAGNOSIS — M99.01 SOMATIC DYSFUNCTION OF CERVICAL REGION: ICD-10-CM

## 2025-08-08 DIAGNOSIS — M99.02 SOMATIC DYSFUNCTION OF THORACIC REGION: ICD-10-CM

## 2025-08-08 DIAGNOSIS — G89.29 ACUTE ON CHRONIC LOW BACK PAIN: Primary | ICD-10-CM

## 2025-08-08 DIAGNOSIS — M99.04 SOMATIC DYSFUNCTION OF SACRAL REGION: ICD-10-CM

## 2025-08-08 DIAGNOSIS — M54.50 ACUTE ON CHRONIC LOW BACK PAIN: Primary | ICD-10-CM

## 2025-08-08 DIAGNOSIS — M99.06 SOMATIC DYSFUNCTION OF LOWER EXTREMITY: ICD-10-CM

## 2025-08-08 DIAGNOSIS — M99.05 SOMATIC DYSFUNCTION OF PELVIS REGION: ICD-10-CM

## 2025-08-08 DIAGNOSIS — M99.00 SOMATIC DYSFUNCTION OF HEAD REGION: ICD-10-CM

## 2025-08-08 DIAGNOSIS — M99.03 SOMATIC DYSFUNCTION OF LUMBAR REGION: ICD-10-CM

## 2025-08-08 NOTE — PROGRESS NOTES
General:  Healthy 74 y/o F. Reporting benefit from last OMT session.    Great balance, posture, and strength at baseline function; patient in no acute distress.     No sign of pathological impingement, strains, fractures, instabilities, or metastatic process appreciated on examination.     Patient was examined in the following positions: standing, seated, supine, lateral recumbent    Neuro:   Muscle strength:   5/5 (R)     65/5 (L)  Sensory intact B/L  Cerebellar: Intact     Structural Exam:   Posture/Gait: posture with some forward bending at hips   Structural Curvature (Lordosis/ Kyphosis/ Scoliosis): ++ thoracic kyphosis   Leg Length: (longer) L   1st Rib: (elevation)   R   Straight leg test: negative b/L  Radiculopathy:  negative b/L  Yovanny: negative b/L  Spurling: negative b/L    Osteopathic Examination:   General fascial pull to Right sided S2   Diaphragms: OA, thoracic inlet, respiratory diaphragm, pelvic diaphragm  L/R/R/L    Cranial:   OA/AA general restriction  AOGR: OA  Alar ligament restriction      Cervical:  C2 dysfunction  C3-5 R dysfunction, likely underlying osteoarthritis   Cervical fascial restriction to sacrum      Thoracic inlet restriction   Midsternal tenderpoint  Diaphragm restricted b/l with rib flare  Xyphoid area/celiac ganglion restriction     Rib dysfunction:  Thoracic dysfunction: Kyphosis  T1 F rR   T12 restricted b/l QL hypertonicity   Lumbar dysfunction: anterolisthesis   Sacral dysfunction: L on L         LOWER  Leg length: L longer   Innominate dysfunction: Right anterior innominate   Acetabular joint healthy and scrub test negative   B/l foot arch dysfunction    Left QL hypertonicity and lateral glute tenderpoints and hypertonicity  L psoas hypertonicity    Trigger points:  SCS points:   FDM points:  Velez points:   Areas of sympathetic reflex response:    Osteopathic Manipulative Treatments were used to address the above somatic dysfunctions. Techniques used included:   ST    MFR  ART   CR   CS  ME  VIS   FPR   DIR  IND LAS   BLT   Still     After treatment there was lessened or absent hypertonicity in above regions.   The patient was able to breathe much more deeply and general posture was improved.     Patient tolerated procedure well and left with questions answered. Patient aware that it could be some mild soreness and discomfort for several days after Osteopathic maybe the treatment due to the neuromuscular changes that occur due to the OMT so they are using muscles more efficiently but differently which can create delayed onset muscle soreness similar to what occurs after any exercise that is new to the body.     Patient tolerated procedure well with no complications. Patient was educated on possible post-treatment reaction such as fatigue and myalgias and appropriate treatment for this.    I have personally reviewed all available pertinent labs, imaging, and consult notes with the patient.      All questions and concerns were addressed. Patient verbalizes understanding instructions and agrees with established plan of care.      Patient discussed with Dr. Dre Paul DO, MA   PGY-2 [facility]

## 2025-08-08 NOTE — PATIENT INSTRUCTIONS
Stay active and walk/exercise as usual with no restrictions, and drink plenty of water post treatment. Please do not do more than your usual activity level even if you're feeling wonderful after treatment. Be aware of feeling sore post-treatment, but this is generally normal. It may take your body up to 2 weeks to regulate after osteopathic manipulative treatment in which new aches and pains may come up. Soreness, new movement patterning, and slight coordination issues are normal post treatment up to 2 weeks. Please schedule a follow up visit in 8 weeks if you have ongoing pain that is the same as before treatment or in a new area.

## 2025-08-09 NOTE — PROGRESS NOTES
I reviewed and examined the patient. I was present for the key exam elements, and I fully participated in the patient's care. I discussed the management of the care with the resident. I have personally reviewed the pertinent labs and imaging, as well as recent notes, with the patient. I have reviewed the note above and agree with the resident's medical decision making as documented in the resident's note, in addition to the following comments / findings:     Agree with the rest of the plan outlined below by resident physician. No red flags.      The patient understands and agrees to the assessment and plan of care. Patient has also agreed to follow up and comply with the treatment and evaluation as recommended today. Patient was instructed to call the office at [phone] should questions arise regarding their treatment or care.     Timoteo Erickson DO, FAOASM  Family Medicine   [address], [address]  [place] [address]     Timoteo Erickson DO

## 2025-10-03 ENCOUNTER — APPOINTMENT (OUTPATIENT)
Facility: CLINIC | Age: 75
End: 2025-10-03
Payer: MEDICARE

## 2025-11-13 ENCOUNTER — APPOINTMENT (OUTPATIENT)
Dept: OTOLARYNGOLOGY | Facility: CLINIC | Age: 75
End: 2025-11-13
Payer: MEDICARE

## 2026-06-23 ENCOUNTER — APPOINTMENT (OUTPATIENT)
Facility: CLINIC | Age: 76
End: 2026-06-23
Payer: MEDICARE